# Patient Record
Sex: FEMALE | Race: WHITE | Employment: UNEMPLOYED | URBAN - METROPOLITAN AREA
[De-identification: names, ages, dates, MRNs, and addresses within clinical notes are randomized per-mention and may not be internally consistent; named-entity substitution may affect disease eponyms.]

---

## 2017-04-11 PROBLEM — G93.41 ACUTE METABOLIC ENCEPHALOPATHY: Status: ACTIVE | Noted: 2017-04-11

## 2017-05-25 ENCOUNTER — HOSPITAL ENCOUNTER (OUTPATIENT)
Dept: SURGERY | Age: 67
Discharge: HOME | End: 2017-05-25
Attending: OPHTHALMOLOGY | Admitting: OPHTHALMOLOGY

## 2017-05-25 VITALS
TEMPERATURE: 98.4 F | OXYGEN SATURATION: 96 % | SYSTOLIC BLOOD PRESSURE: 131 MMHG | WEIGHT: 94 LBS | HEART RATE: 100 BPM | HEIGHT: 61 IN | BODY MASS INDEX: 17.75 KG/M2 | RESPIRATION RATE: 16 BRPM | DIASTOLIC BLOOD PRESSURE: 87 MMHG

## 2017-05-25 RX ORDER — OXYCODONE HYDROCHLORIDE AND ACETAMINOPHEN 5; 325 MG/1; MG/1
2 TABLET ORAL PRN
Status: ACTIVE | OUTPATIENT
Start: 2017-05-25 | End: 2017-05-25

## 2017-05-25 RX ORDER — SODIUM CHLORIDE, SODIUM LACTATE, POTASSIUM CHLORIDE, CALCIUM CHLORIDE 600; 310; 30; 20 MG/100ML; MG/100ML; MG/100ML; MG/100ML
INJECTION, SOLUTION INTRAVENOUS CONTINUOUS
Status: DISCONTINUED | OUTPATIENT
Start: 2017-05-25 | End: 2017-05-26 | Stop reason: HOSPADM

## 2017-05-25 RX ORDER — MORPHINE SULFATE 2 MG/ML
2 INJECTION, SOLUTION INTRAMUSCULAR; INTRAVENOUS EVERY 5 MIN PRN
Status: DISCONTINUED | OUTPATIENT
Start: 2017-05-25 | End: 2017-05-26 | Stop reason: HOSPADM

## 2017-05-25 RX ORDER — LIDOCAINE HYDROCHLORIDE 10 MG/ML
1 INJECTION, SOLUTION EPIDURAL; INFILTRATION; INTRACAUDAL; PERINEURAL
Status: ACTIVE | OUTPATIENT
Start: 2017-05-25 | End: 2017-05-25

## 2017-05-25 RX ORDER — PROMETHAZINE HYDROCHLORIDE 25 MG/ML
6.25 INJECTION, SOLUTION INTRAMUSCULAR; INTRAVENOUS
Status: DISCONTINUED | OUTPATIENT
Start: 2017-05-25 | End: 2017-05-26 | Stop reason: HOSPADM

## 2017-05-25 RX ORDER — MEPERIDINE HYDROCHLORIDE 50 MG/ML
12.5 INJECTION INTRAMUSCULAR; INTRAVENOUS; SUBCUTANEOUS EVERY 5 MIN PRN
Status: DISCONTINUED | OUTPATIENT
Start: 2017-05-25 | End: 2017-05-26 | Stop reason: HOSPADM

## 2017-05-25 RX ORDER — OXYCODONE HYDROCHLORIDE AND ACETAMINOPHEN 5; 325 MG/1; MG/1
1 TABLET ORAL PRN
Status: ACTIVE | OUTPATIENT
Start: 2017-05-25 | End: 2017-05-25

## 2017-05-25 RX ORDER — SODIUM CHLORIDE 0.9 % (FLUSH) 0.9 %
10 SYRINGE (ML) INJECTION EVERY 12 HOURS SCHEDULED
Status: DISCONTINUED | OUTPATIENT
Start: 2017-05-25 | End: 2017-05-26 | Stop reason: HOSPADM

## 2017-05-25 RX ORDER — LABETALOL HYDROCHLORIDE 5 MG/ML
5 INJECTION, SOLUTION INTRAVENOUS EVERY 10 MIN PRN
Status: DISCONTINUED | OUTPATIENT
Start: 2017-05-25 | End: 2017-05-26 | Stop reason: HOSPADM

## 2017-05-25 RX ORDER — SODIUM CHLORIDE 0.9 % (FLUSH) 0.9 %
10 SYRINGE (ML) INJECTION PRN
Status: DISCONTINUED | OUTPATIENT
Start: 2017-05-25 | End: 2017-05-26 | Stop reason: HOSPADM

## 2017-05-25 RX ORDER — DIPHENHYDRAMINE HYDROCHLORIDE 50 MG/ML
12.5 INJECTION INTRAMUSCULAR; INTRAVENOUS
Status: ACTIVE | OUTPATIENT
Start: 2017-05-25 | End: 2017-05-25

## 2017-05-25 RX ORDER — MORPHINE SULFATE 2 MG/ML
1 INJECTION, SOLUTION INTRAMUSCULAR; INTRAVENOUS EVERY 5 MIN PRN
Status: DISCONTINUED | OUTPATIENT
Start: 2017-05-25 | End: 2017-05-26 | Stop reason: HOSPADM

## 2017-05-25 RX ORDER — ONDANSETRON 2 MG/ML
4 INJECTION INTRAMUSCULAR; INTRAVENOUS PRN
Status: DISCONTINUED | OUTPATIENT
Start: 2017-05-25 | End: 2017-05-26 | Stop reason: HOSPADM

## 2017-05-25 RX ORDER — HYDRALAZINE HYDROCHLORIDE 20 MG/ML
5 INJECTION INTRAMUSCULAR; INTRAVENOUS EVERY 10 MIN PRN
Status: DISCONTINUED | OUTPATIENT
Start: 2017-05-25 | End: 2017-05-26 | Stop reason: HOSPADM

## 2017-05-25 RX ADMIN — SODIUM CHLORIDE, SODIUM LACTATE, POTASSIUM CHLORIDE, CALCIUM CHLORIDE: 600; 310; 30; 20 INJECTION, SOLUTION INTRAVENOUS at 09:33

## 2017-05-25 ASSESSMENT — PAIN - FUNCTIONAL ASSESSMENT: PAIN_FUNCTIONAL_ASSESSMENT: 0-10

## 2017-05-25 ASSESSMENT — PAIN SCALES - GENERAL: PAINLEVEL_OUTOF10: 0

## 2017-05-25 ASSESSMENT — ENCOUNTER SYMPTOMS: SHORTNESS OF BREATH: 1

## 2017-06-01 ENCOUNTER — HOSPITAL ENCOUNTER (OUTPATIENT)
Dept: SURGERY | Age: 67
Discharge: HOME | End: 2017-06-01
Attending: OPHTHALMOLOGY | Admitting: OPHTHALMOLOGY

## 2017-06-01 VITALS
BODY MASS INDEX: 17.75 KG/M2 | HEIGHT: 61 IN | DIASTOLIC BLOOD PRESSURE: 85 MMHG | RESPIRATION RATE: 18 BRPM | SYSTOLIC BLOOD PRESSURE: 131 MMHG | OXYGEN SATURATION: 94 % | WEIGHT: 94 LBS | TEMPERATURE: 98.4 F | HEART RATE: 100 BPM

## 2017-06-01 RX ORDER — LIDOCAINE HYDROCHLORIDE 10 MG/ML
1 INJECTION, SOLUTION EPIDURAL; INFILTRATION; INTRACAUDAL; PERINEURAL
Status: ACTIVE | OUTPATIENT
Start: 2017-06-01 | End: 2017-06-01

## 2017-06-01 RX ORDER — SODIUM CHLORIDE 0.9 % (FLUSH) 0.9 %
10 SYRINGE (ML) INJECTION EVERY 12 HOURS SCHEDULED
Status: DISCONTINUED | OUTPATIENT
Start: 2017-06-01 | End: 2017-06-02 | Stop reason: HOSPADM

## 2017-06-01 RX ORDER — LABETALOL HYDROCHLORIDE 5 MG/ML
5 INJECTION, SOLUTION INTRAVENOUS EVERY 10 MIN PRN
Status: DISCONTINUED | OUTPATIENT
Start: 2017-06-01 | End: 2017-06-02 | Stop reason: HOSPADM

## 2017-06-01 RX ORDER — HYDRALAZINE HYDROCHLORIDE 20 MG/ML
5 INJECTION INTRAMUSCULAR; INTRAVENOUS EVERY 10 MIN PRN
Status: DISCONTINUED | OUTPATIENT
Start: 2017-06-01 | End: 2017-06-02 | Stop reason: HOSPADM

## 2017-06-01 RX ORDER — SODIUM CHLORIDE, SODIUM LACTATE, POTASSIUM CHLORIDE, CALCIUM CHLORIDE 600; 310; 30; 20 MG/100ML; MG/100ML; MG/100ML; MG/100ML
INJECTION, SOLUTION INTRAVENOUS CONTINUOUS
Status: DISCONTINUED | OUTPATIENT
Start: 2017-06-01 | End: 2017-06-02 | Stop reason: HOSPADM

## 2017-06-01 RX ORDER — SODIUM CHLORIDE 0.9 % (FLUSH) 0.9 %
10 SYRINGE (ML) INJECTION PRN
Status: DISCONTINUED | OUTPATIENT
Start: 2017-06-01 | End: 2017-06-02 | Stop reason: HOSPADM

## 2017-06-01 RX ORDER — ONDANSETRON 2 MG/ML
4 INJECTION INTRAMUSCULAR; INTRAVENOUS
Status: ACTIVE | OUTPATIENT
Start: 2017-06-01 | End: 2017-06-01

## 2017-06-01 RX ADMIN — SODIUM CHLORIDE, SODIUM LACTATE, POTASSIUM CHLORIDE, CALCIUM CHLORIDE: 600; 310; 30; 20 INJECTION, SOLUTION INTRAVENOUS at 08:20

## 2017-06-01 ASSESSMENT — COPD QUESTIONNAIRES: CAT_SEVERITY: MILD

## 2017-06-01 ASSESSMENT — PAIN - FUNCTIONAL ASSESSMENT: PAIN_FUNCTIONAL_ASSESSMENT: 0-10

## 2017-08-01 ENCOUNTER — HOSPITAL ENCOUNTER (OUTPATIENT)
Dept: MRI IMAGING | Age: 67
Discharge: HOME | End: 2017-08-01
Attending: NEUROLOGICAL SURGERY | Admitting: NEUROLOGICAL SURGERY

## 2017-08-01 DIAGNOSIS — I67.1 CEREBRAL ANEURYSM, NONRUPTURED: ICD-10-CM

## 2019-03-27 ENCOUNTER — APPOINTMENT (OUTPATIENT)
Dept: CT IMAGING | Age: 69
End: 2019-03-27
Payer: MEDICARE

## 2019-03-27 ENCOUNTER — APPOINTMENT (OUTPATIENT)
Dept: GENERAL RADIOLOGY | Age: 69
End: 2019-03-27
Payer: MEDICARE

## 2019-03-27 ENCOUNTER — HOSPITAL ENCOUNTER (EMERGENCY)
Age: 69
Discharge: HOME | End: 2019-03-27
Attending: EMERGENCY MEDICINE
Payer: MEDICARE

## 2019-03-27 VITALS
TEMPERATURE: 98.1 F | HEART RATE: 91 BPM | RESPIRATION RATE: 16 BRPM | WEIGHT: 102 LBS | HEIGHT: 61 IN | DIASTOLIC BLOOD PRESSURE: 90 MMHG | SYSTOLIC BLOOD PRESSURE: 178 MMHG | BODY MASS INDEX: 19.26 KG/M2 | OXYGEN SATURATION: 91 %

## 2019-03-27 DIAGNOSIS — R05.9 COUGH: ICD-10-CM

## 2019-03-27 DIAGNOSIS — J44.1 COPD EXACERBATION (HCC): Primary | ICD-10-CM

## 2019-03-27 LAB
A/G RATIO: 1.5 (ref 1.1–2.2)
ALBUMIN SERPL-MCNC: 4.1 G/DL (ref 3.4–5)
ALP BLD-CCNC: 68 U/L (ref 40–129)
ALT SERPL-CCNC: 11 U/L (ref 10–40)
ANION GAP SERPL CALCULATED.3IONS-SCNC: 10 MMOL/L (ref 3–16)
ANISOCYTOSIS: ABNORMAL
AST SERPL-CCNC: 21 U/L (ref 15–37)
ATYPICAL LYMPHOCYTE RELATIVE PERCENT: 12 % (ref 0–6)
BANDED NEUTROPHILS RELATIVE PERCENT: 7 % (ref 0–7)
BASOPHILS ABSOLUTE: 0.1 K/UL (ref 0–0.2)
BASOPHILS RELATIVE PERCENT: 1 %
BILIRUB SERPL-MCNC: 0.3 MG/DL (ref 0–1)
BUN BLDV-MCNC: 5 MG/DL (ref 7–20)
CALCIUM SERPL-MCNC: 9.2 MG/DL (ref 8.3–10.6)
CHLORIDE BLD-SCNC: 98 MMOL/L (ref 99–110)
CO2: 32 MMOL/L (ref 21–32)
CREAT SERPL-MCNC: 0.6 MG/DL (ref 0.6–1.2)
EKG ATRIAL RATE: 84 BPM
EKG DIAGNOSIS: NORMAL
EKG P AXIS: 75 DEGREES
EKG P-R INTERVAL: 138 MS
EKG Q-T INTERVAL: 366 MS
EKG QRS DURATION: 58 MS
EKG QTC CALCULATION (BAZETT): 432 MS
EKG R AXIS: 53 DEGREES
EKG T AXIS: 68 DEGREES
EKG VENTRICULAR RATE: 84 BPM
EOSINOPHILS ABSOLUTE: 0.1 K/UL (ref 0–0.6)
EOSINOPHILS RELATIVE PERCENT: 1 %
GFR AFRICAN AMERICAN: >60
GFR NON-AFRICAN AMERICAN: >60
GLOBULIN: 2.7 G/DL
GLUCOSE BLD-MCNC: 84 MG/DL (ref 70–99)
HCT VFR BLD CALC: 45.2 % (ref 36–48)
HEMATOLOGY PATH CONSULT: YES
HEMOGLOBIN: 15 G/DL (ref 12–16)
LYMPHOCYTES ABSOLUTE: 1.7 K/UL (ref 1–5.1)
LYMPHOCYTES RELATIVE PERCENT: 18 %
MCH RBC QN AUTO: 30.2 PG (ref 26–34)
MCHC RBC AUTO-ENTMCNC: 33.2 G/DL (ref 31–36)
MCV RBC AUTO: 90.9 FL (ref 80–100)
MONOCYTES ABSOLUTE: 0.7 K/UL (ref 0–1.3)
MONOCYTES RELATIVE PERCENT: 13 %
NEUTROPHILS ABSOLUTE: 3 K/UL (ref 1.7–7.7)
NEUTROPHILS RELATIVE PERCENT: 48 %
PDW BLD-RTO: 14 % (ref 12.4–15.4)
PLATELET # BLD: 177 K/UL (ref 135–450)
PLATELET SLIDE REVIEW: ADEQUATE
PMV BLD AUTO: 8.6 FL (ref 5–10.5)
POIKILOCYTES: ABNORMAL
POTASSIUM SERPL-SCNC: 4.5 MMOL/L (ref 3.5–5.1)
RBC # BLD: 4.97 M/UL (ref 4–5.2)
SODIUM BLD-SCNC: 140 MMOL/L (ref 136–145)
TOTAL PROTEIN: 6.8 G/DL (ref 6.4–8.2)
TROPONIN: <0.01 NG/ML
WBC # BLD: 5.5 K/UL (ref 4–11)

## 2019-03-27 PROCEDURE — 93005 ELECTROCARDIOGRAM TRACING: CPT | Performed by: EMERGENCY MEDICINE

## 2019-03-27 PROCEDURE — 80053 COMPREHEN METABOLIC PANEL: CPT

## 2019-03-27 PROCEDURE — 6360000002 HC RX W HCPCS: Performed by: NURSE PRACTITIONER

## 2019-03-27 PROCEDURE — 70496 CT ANGIOGRAPHY HEAD: CPT

## 2019-03-27 PROCEDURE — 84484 ASSAY OF TROPONIN QUANT: CPT

## 2019-03-27 PROCEDURE — 99285 EMERGENCY DEPT VISIT HI MDM: CPT

## 2019-03-27 PROCEDURE — 94640 AIRWAY INHALATION TREATMENT: CPT

## 2019-03-27 PROCEDURE — 6360000004 HC RX CONTRAST MEDICATION: Performed by: EMERGENCY MEDICINE

## 2019-03-27 PROCEDURE — 70450 CT HEAD/BRAIN W/O DYE: CPT

## 2019-03-27 PROCEDURE — 71046 X-RAY EXAM CHEST 2 VIEWS: CPT

## 2019-03-27 PROCEDURE — 85025 COMPLETE CBC W/AUTO DIFF WBC: CPT

## 2019-03-27 PROCEDURE — 93010 ELECTROCARDIOGRAM REPORT: CPT | Performed by: INTERNAL MEDICINE

## 2019-03-27 PROCEDURE — 70498 CT ANGIOGRAPHY NECK: CPT

## 2019-03-27 PROCEDURE — 6370000000 HC RX 637 (ALT 250 FOR IP): Performed by: NURSE PRACTITIONER

## 2019-03-27 RX ORDER — IPRATROPIUM BROMIDE AND ALBUTEROL SULFATE 2.5; .5 MG/3ML; MG/3ML
1 SOLUTION RESPIRATORY (INHALATION) ONCE
Status: COMPLETED | OUTPATIENT
Start: 2019-03-27 | End: 2019-03-27

## 2019-03-27 RX ORDER — ALBUTEROL SULFATE 90 UG/1
AEROSOL, METERED RESPIRATORY (INHALATION)
Qty: 1 INHALER | Refills: 1 | Status: SHIPPED | OUTPATIENT
Start: 2019-03-27

## 2019-03-27 RX ORDER — PREDNISONE 10 MG/1
40 TABLET ORAL DAILY
Qty: 20 TABLET | Refills: 0 | Status: SHIPPED | OUTPATIENT
Start: 2019-03-27 | End: 2019-04-01

## 2019-03-27 RX ORDER — PREDNISONE 20 MG/1
40 TABLET ORAL ONCE
Status: COMPLETED | OUTPATIENT
Start: 2019-03-27 | End: 2019-03-27

## 2019-03-27 RX ORDER — ALBUTEROL SULFATE 2.5 MG/3ML
5 SOLUTION RESPIRATORY (INHALATION) ONCE
Status: COMPLETED | OUTPATIENT
Start: 2019-03-27 | End: 2019-03-27

## 2019-03-27 RX ADMIN — ALBUTEROL SULFATE 5 MG: 2.5 SOLUTION RESPIRATORY (INHALATION) at 13:50

## 2019-03-27 RX ADMIN — IPRATROPIUM BROMIDE AND ALBUTEROL SULFATE 1 AMPULE: .5; 3 SOLUTION RESPIRATORY (INHALATION) at 13:50

## 2019-03-27 RX ADMIN — IOPAMIDOL 75 ML: 755 INJECTION, SOLUTION INTRAVENOUS at 14:45

## 2019-03-27 RX ADMIN — PREDNISONE 40 MG: 20 TABLET ORAL at 15:42

## 2019-03-27 ASSESSMENT — ENCOUNTER SYMPTOMS
COUGH: 1
NAUSEA: 1
BACK PAIN: 0
VOMITING: 0
ALLERGIC/IMMUNOLOGIC NEGATIVE: 1
EYES NEGATIVE: 1
ABDOMINAL DISTENTION: 0
SHORTNESS OF BREATH: 1
ABDOMINAL PAIN: 0
WHEEZING: 1

## 2019-03-28 ENCOUNTER — TELEPHONE (OUTPATIENT)
Dept: PULMONOLOGY | Age: 69
End: 2019-03-28

## 2019-03-28 LAB — HEMATOLOGY PATH CONSULT: NORMAL

## 2019-03-28 NOTE — LETTER
[facility] Pulmonary Critical Care and Sleep  [address] [place] [address] Gustavo Carter  Phone: [phone]  Fax: [phone]    Evon Alvarez MD        April 9, 2019     Minda Jara, [address] [place] [address]    Patient: Siobhan Wright   MR Number: [id number]   YOB: 1950   Date of Visit: 3/28/2019       Dear Minda Jara:    This letter is to keep you informed of your patient, Siobhan Wright, who you referred to me for COPD. She was scheduled to see me on 3-28-19. The patient received a reminder call but did not keep the appointment. As this is the first New Patient No Show, we will reschedule if the patient contacts us. I appreciate the confidence and trust you place in me with your referrals and am glad to be of service. If you have questions, please do not hesitate to call me. I look forward to following Aditya Obando along with you.     Sincerely,          Evon Alvarez MD

## 2019-03-28 NOTE — TELEPHONE ENCOUNTER
Patient did not show for NPT COPD visit  with Dr. Brooke Zazueta on 3/28/19. Reason:  Unknown    This is patient's first no show. Patient was ano show on: NA.      Patient did not reschedule. Reschedule date:  Pt will need to be called to reschedule appt.

## 2019-04-01 ENCOUNTER — APPOINTMENT (OUTPATIENT)
Dept: GENERAL RADIOLOGY | Age: 69
DRG: 189 | End: 2019-04-01
Payer: MEDICARE

## 2019-04-01 ENCOUNTER — HOSPITAL ENCOUNTER (INPATIENT)
Age: 69
LOS: 1 days | Discharge: LEFT BEFORE DISCHARGE | DRG: 189 | End: 2019-04-02
Attending: EMERGENCY MEDICINE | Admitting: INTERNAL MEDICINE
Payer: MEDICARE

## 2019-04-01 DIAGNOSIS — J44.1 COPD EXACERBATION (HCC): Primary | ICD-10-CM

## 2019-04-01 PROBLEM — J44.9 COPD (CHRONIC OBSTRUCTIVE PULMONARY DISEASE) (HCC): Status: ACTIVE | Noted: 2019-04-01

## 2019-04-01 LAB
A/G RATIO: 1.2 (ref 1.1–2.2)
ALBUMIN SERPL-MCNC: 3.8 G/DL (ref 3.4–5)
ALP BLD-CCNC: 58 U/L (ref 40–129)
ALT SERPL-CCNC: 13 U/L (ref 10–40)
ANION GAP SERPL CALCULATED.3IONS-SCNC: 14 MMOL/L (ref 3–16)
ANISOCYTOSIS: ABNORMAL
AST SERPL-CCNC: 24 U/L (ref 15–37)
ATYPICAL LYMPHOCYTE RELATIVE PERCENT: 9 % (ref 0–6)
BACTERIA: ABNORMAL /HPF
BANDED NEUTROPHILS RELATIVE PERCENT: 18 % (ref 0–7)
BASOPHILS ABSOLUTE: 0 K/UL (ref 0–0.2)
BASOPHILS RELATIVE PERCENT: 0 %
BILIRUB SERPL-MCNC: 0.5 MG/DL (ref 0–1)
BILIRUBIN URINE: ABNORMAL
BLOOD, URINE: NEGATIVE
BUN BLDV-MCNC: 12 MG/DL (ref 7–20)
CALCIUM SERPL-MCNC: 9.3 MG/DL (ref 8.3–10.6)
CHLORIDE BLD-SCNC: 89 MMOL/L (ref 99–110)
CLARITY: ABNORMAL
CO2: 31 MMOL/L (ref 21–32)
COLOR: ABNORMAL
CREAT SERPL-MCNC: <0.5 MG/DL (ref 0.6–1.2)
EOSINOPHILS ABSOLUTE: 0 K/UL (ref 0–0.6)
EOSINOPHILS RELATIVE PERCENT: 0 %
EPITHELIAL CELLS, UA: ABNORMAL /HPF
GFR AFRICAN AMERICAN: >60
GFR NON-AFRICAN AMERICAN: >60
GLOBULIN: 3.1 G/DL
GLUCOSE BLD-MCNC: 109 MG/DL (ref 70–99)
GLUCOSE URINE: NEGATIVE MG/DL
HCT VFR BLD CALC: 45.7 % (ref 36–48)
HEMATOLOGY PATH CONSULT: NO
HEMOGLOBIN: 15.4 G/DL (ref 12–16)
KETONES, URINE: 15 MG/DL
LEUKOCYTE ESTERASE, URINE: NEGATIVE
LYMPHOCYTES ABSOLUTE: 1.6 K/UL (ref 1–5.1)
LYMPHOCYTES RELATIVE PERCENT: 13 %
MCH RBC QN AUTO: 30.4 PG (ref 26–34)
MCHC RBC AUTO-ENTMCNC: 33.6 G/DL (ref 31–36)
MCV RBC AUTO: 90.4 FL (ref 80–100)
MICROSCOPIC EXAMINATION: YES
MONOCYTES ABSOLUTE: 0.4 K/UL (ref 0–1.3)
MONOCYTES RELATIVE PERCENT: 6 %
NEUTROPHILS ABSOLUTE: 5.3 K/UL (ref 1.7–7.7)
NEUTROPHILS RELATIVE PERCENT: 54 %
NITRITE, URINE: NEGATIVE
PDW BLD-RTO: 13.5 % (ref 12.4–15.4)
PH UA: 6 (ref 5–8)
PLATELET # BLD: 155 K/UL (ref 135–450)
PLATELET SLIDE REVIEW: ADEQUATE
PMV BLD AUTO: 8.2 FL (ref 5–10.5)
POIKILOCYTES: ABNORMAL
POTASSIUM SERPL-SCNC: 3.7 MMOL/L (ref 3.5–5.1)
PROTEIN UA: ABNORMAL MG/DL
RBC # BLD: 5.06 M/UL (ref 4–5.2)
RBC UA: ABNORMAL /HPF (ref 0–2)
SODIUM BLD-SCNC: 134 MMOL/L (ref 136–145)
SPECIFIC GRAVITY UA: >=1.03 (ref 1–1.03)
TOTAL PROTEIN: 6.9 G/DL (ref 6.4–8.2)
TROPONIN: <0.01 NG/ML
URINE REFLEX TO CULTURE: ABNORMAL
URINE TYPE: ABNORMAL
UROBILINOGEN, URINE: 0.2 E.U./DL
WBC # BLD: 7.4 K/UL (ref 4–11)
WBC UA: ABNORMAL /HPF (ref 0–5)

## 2019-04-01 PROCEDURE — 6360000002 HC RX W HCPCS: Performed by: INTERNAL MEDICINE

## 2019-04-01 PROCEDURE — 93005 ELECTROCARDIOGRAM TRACING: CPT | Performed by: EMERGENCY MEDICINE

## 2019-04-01 PROCEDURE — 71046 X-RAY EXAM CHEST 2 VIEWS: CPT

## 2019-04-01 PROCEDURE — 6370000000 HC RX 637 (ALT 250 FOR IP): Performed by: INTERNAL MEDICINE

## 2019-04-01 PROCEDURE — 80053 COMPREHEN METABOLIC PANEL: CPT

## 2019-04-01 PROCEDURE — 1200000000 HC SEMI PRIVATE

## 2019-04-01 PROCEDURE — 81001 URINALYSIS AUTO W/SCOPE: CPT

## 2019-04-01 PROCEDURE — 6370000000 HC RX 637 (ALT 250 FOR IP): Performed by: EMERGENCY MEDICINE

## 2019-04-01 PROCEDURE — 2580000003 HC RX 258

## 2019-04-01 PROCEDURE — 2580000003 HC RX 258: Performed by: INTERNAL MEDICINE

## 2019-04-01 PROCEDURE — 99285 EMERGENCY DEPT VISIT HI MDM: CPT

## 2019-04-01 PROCEDURE — 96365 THER/PROPH/DIAG IV INF INIT: CPT

## 2019-04-01 PROCEDURE — 85025 COMPLETE CBC W/AUTO DIFF WBC: CPT

## 2019-04-01 PROCEDURE — 2700000000 HC OXYGEN THERAPY PER DAY

## 2019-04-01 PROCEDURE — 84484 ASSAY OF TROPONIN QUANT: CPT

## 2019-04-01 PROCEDURE — 94761 N-INVAS EAR/PLS OXIMETRY MLT: CPT

## 2019-04-01 PROCEDURE — 94640 AIRWAY INHALATION TREATMENT: CPT

## 2019-04-01 RX ORDER — PREDNISONE 20 MG/1
60 TABLET ORAL ONCE
Status: COMPLETED | OUTPATIENT
Start: 2019-04-01 | End: 2019-04-01

## 2019-04-01 RX ORDER — SODIUM CHLORIDE 0.9 % (FLUSH) 0.9 %
10 SYRINGE (ML) INJECTION EVERY 12 HOURS SCHEDULED
Status: DISCONTINUED | OUTPATIENT
Start: 2019-04-01 | End: 2019-04-02 | Stop reason: HOSPADM

## 2019-04-01 RX ORDER — ATORVASTATIN CALCIUM 40 MG/1
40 TABLET, FILM COATED ORAL DAILY
Status: DISCONTINUED | OUTPATIENT
Start: 2019-04-02 | End: 2019-04-02 | Stop reason: HOSPADM

## 2019-04-01 RX ORDER — IPRATROPIUM BROMIDE AND ALBUTEROL SULFATE 2.5; .5 MG/3ML; MG/3ML
1 SOLUTION RESPIRATORY (INHALATION)
Status: DISCONTINUED | OUTPATIENT
Start: 2019-04-02 | End: 2019-04-02 | Stop reason: HOSPADM

## 2019-04-01 RX ORDER — ALBUTEROL SULFATE 90 UG/1
2 AEROSOL, METERED RESPIRATORY (INHALATION) EVERY 6 HOURS PRN
Status: DISCONTINUED | OUTPATIENT
Start: 2019-04-01 | End: 2019-04-02 | Stop reason: HOSPADM

## 2019-04-01 RX ORDER — IPRATROPIUM BROMIDE AND ALBUTEROL SULFATE 2.5; .5 MG/3ML; MG/3ML
1 SOLUTION RESPIRATORY (INHALATION) ONCE
Status: COMPLETED | OUTPATIENT
Start: 2019-04-01 | End: 2019-04-01

## 2019-04-01 RX ORDER — ONDANSETRON 2 MG/ML
4 INJECTION INTRAMUSCULAR; INTRAVENOUS EVERY 6 HOURS PRN
Status: DISCONTINUED | OUTPATIENT
Start: 2019-04-01 | End: 2019-04-02 | Stop reason: HOSPADM

## 2019-04-01 RX ORDER — AMLODIPINE BESYLATE 5 MG/1
5 TABLET ORAL DAILY
Status: DISCONTINUED | OUTPATIENT
Start: 2019-04-02 | End: 2019-04-02 | Stop reason: HOSPADM

## 2019-04-01 RX ORDER — ACETAMINOPHEN 325 MG/1
650 TABLET ORAL EVERY 4 HOURS PRN
Status: DISCONTINUED | OUTPATIENT
Start: 2019-04-01 | End: 2019-04-02 | Stop reason: HOSPADM

## 2019-04-01 RX ORDER — PREDNISONE 20 MG/1
40 TABLET ORAL DAILY
Status: DISCONTINUED | OUTPATIENT
Start: 2019-04-04 | End: 2019-04-02 | Stop reason: HOSPADM

## 2019-04-01 RX ORDER — SODIUM CHLORIDE 9 MG/ML
INJECTION, SOLUTION INTRAVENOUS
Status: COMPLETED
Start: 2019-04-01 | End: 2019-04-01

## 2019-04-01 RX ORDER — SODIUM CHLORIDE 0.9 % (FLUSH) 0.9 %
10 SYRINGE (ML) INJECTION PRN
Status: DISCONTINUED | OUTPATIENT
Start: 2019-04-01 | End: 2019-04-02 | Stop reason: HOSPADM

## 2019-04-01 RX ORDER — METHYLPREDNISOLONE SODIUM SUCCINATE 40 MG/ML
40 INJECTION, POWDER, LYOPHILIZED, FOR SOLUTION INTRAMUSCULAR; INTRAVENOUS EVERY 6 HOURS
Status: DISCONTINUED | OUTPATIENT
Start: 2019-04-01 | End: 2019-04-02 | Stop reason: HOSPADM

## 2019-04-01 RX ORDER — LORAZEPAM 0.5 MG/1
0.5 TABLET ORAL EVERY 6 HOURS PRN
Status: DISCONTINUED | OUTPATIENT
Start: 2019-04-01 | End: 2019-04-02

## 2019-04-01 RX ORDER — IPRATROPIUM BROMIDE AND ALBUTEROL SULFATE 2.5; .5 MG/3ML; MG/3ML
2 SOLUTION RESPIRATORY (INHALATION) ONCE
Status: COMPLETED | OUTPATIENT
Start: 2019-04-01 | End: 2019-04-01

## 2019-04-01 RX ORDER — LOSARTAN POTASSIUM 25 MG/1
1 TABLET ORAL
COMMUNITY

## 2019-04-01 RX ADMIN — SODIUM CHLORIDE 100 ML: 900 INJECTION INTRAVENOUS at 23:40

## 2019-04-01 RX ADMIN — IPRATROPIUM BROMIDE AND ALBUTEROL SULFATE 2 AMPULE: .5; 3 SOLUTION RESPIRATORY (INHALATION) at 20:12

## 2019-04-01 RX ADMIN — IPRATROPIUM BROMIDE AND ALBUTEROL SULFATE 1 AMPULE: .5; 3 SOLUTION RESPIRATORY (INHALATION) at 18:59

## 2019-04-01 RX ADMIN — PREDNISONE 60 MG: 20 TABLET ORAL at 18:25

## 2019-04-01 RX ADMIN — CEFTRIAXONE SODIUM 1 G: 1 INJECTION, POWDER, FOR SOLUTION INTRAMUSCULAR; INTRAVENOUS at 23:41

## 2019-04-01 ASSESSMENT — ENCOUNTER SYMPTOMS
VOICE CHANGE: 0
NAUSEA: 0
COLOR CHANGE: 0
STRIDOR: 0
ABDOMINAL PAIN: 0
FACIAL SWELLING: 0
COUGH: 1
SHORTNESS OF BREATH: 1
TROUBLE SWALLOWING: 0
WHEEZING: 1
SPUTUM PRODUCTION: 1
VOMITING: 0

## 2019-04-01 NOTE — ED PROVIDER NOTES
[facility]  ED  eMERGENCY dEPARTMENT eNCOUnter      Pt Name: Yvonne Beavers  MRN: [id number]  [place] 1950  Date of evaluation: 4/1/2019  Provider: Patrick Beck MD    CHIEF COMPLAINT       Chief Complaint   Patient presents with    Shortness of Breath     pt has copd, went to pcp, 82% on RA, 911 called, was evaluated here last week         HISTORY OF PRESENT ILLNESS   (Location/Symptom, Timing/Onset, Context/Setting, Quality, Duration, Modifying Factors, Severity)  Note limiting factors. The history is provided by the patient. Shortness of Breath   Severity:  Moderate  Onset quality:  Gradual  Duration:  2 weeks  Timing:  Constant  Progression:  Worsening  Chronicity:  Recurrent  Context comment:  Hx of COPD, still smoking, reports worsening SOB  Relieved by:  Nothing  Exacerbated by: smoking. Associated symptoms: cough, sputum production and wheezing    Associated symptoms: no abdominal pain, no chest pain, no fever, no neck pain and no vomiting    Risk factors: no hx of PE/DVT        Nursing Notes were reviewed. REVIEW OFSYSTEMS    (2-9 systems for level 4, 10 or more for level 5)     Review of Systems   Constitutional: Negative for appetite change, fever and unexpected weight change. HENT: Negative for facial swelling, trouble swallowing and voice change. Eyes: Negative for visual disturbance. Respiratory: Positive for cough, sputum production, shortness of breath and wheezing. Negative for stridor. Cardiovascular: Negative for chest pain and palpitations. Gastrointestinal: Negative for abdominal pain, nausea and vomiting. Genitourinary: Negative for dysuria and vaginal bleeding. Musculoskeletal: Negative for neck pain and neck stiffness. Skin: Negative for color change and wound. Neurological: Negative for seizures and syncope. Psychiatric/Behavioral: Negative for self-injury and suicidal ideas.        Except as noted above the remainder of the review of systems was reviewed and negative. PAST MEDICAL HISTORY     Past Medical History:   Diagnosis Date    [facility]olic [facility]     Anxiety     COPD (chronic obstructive pulmonary disease) ([facility]     Current smoker     since age 15    Hyperlipidemia     Hypertension          SURGICAL HISTORY       Past Surgical History:   Procedure Laterality Date    CATARACT REMOVAL WITH IMPLANT Right 05/25/2017    CATARACT REMOVAL WITH IMPLANT Left 06/01/2017         CURRENT MEDICATIONS       Previous Medications    ALBUTEROL SULFATE HFA (PROAIR HFA) 108 (90 BASE) MCG/ACT INHALER    Use 2 puffs every 4 hours while awake PRN cough/wheezing  Dispense with SPACER and Instruct on use. May sub Ventolin or Proventil as needed per [employer]. AMLODIPINE (NORVASC) 5 MG TABLET    Take 5 mg by mouth daily    ATORVASTATIN (LIPITOR) 40 MG TABLET    Take 40 mg by mouth daily. LORAZEPAM (ATIVAN) 0.5 MG TABLET    Take 0.5 mg by mouth 3 times daily as needed    TIOTROPIUM (SPIRIVA) 18 MCG INHALATION CAPSULE    Inhale 1 capsule into the lungs daily       ALLERGIES     Sulfa antibiotics and Sulfacetamide    FAMILY HISTORY       Family History   Problem Relation Age of Onset    High Cholesterol Mother     High Blood Pressure Father           SOCIAL HISTORY       Social History     Socioeconomic History    Marital status:       Spouse name: None    Number of children: None    Years of education: None    Highest education level: None   Occupational History    None   Social Needs    Financial resource strain: None    Food insecurity:     Worry: None     Inability: None    Transportation needs:     Medical: None     Non-medical: None   Tobacco Use    Smoking status: Current Every Day Smoker     Packs/day: 0.50     Years: 30.00     Pack years: 15.00     Types: Cigarettes    Smokeless tobacco: Never Used   Substance and Sexual Activity    Alcohol use: Yes     Comment: 2-4    Drug use: No    Sexual activity: None   Lifestyle  Physical activity:     Days per week: None     Minutes per session: None    Stress: None   Relationships    Social connections:     Talks on phone: None     Gets together: None     Attends [employer] service: None     Active member of club or organization: None     Attends meetings of clubs or organizations: None     Relationship status: None    Intimate partner violence:     Fear of current or ex partner: None     Emotionally abused: None     Physically abused: None     Forced sexual activity: None   Other Topics Concern    None   Social History Narrative    None         PHYSICAL EXAM    (up to 7 for level 4, 8 or more for level 5)     ED Triage Vitals [04/01/19 1633]   BP Temp Temp Source Pulse Resp SpO2 Height Weight   (!) 154/93 98.1 °F (36.7 °C) Oral 100 20 (!) 86 % -- 80 lb (36.3 kg)       Physical Exam   Constitutional: She is oriented to person, place, and time. She appears well-developed and well-nourished. HENT:   Head: Normocephalic and atraumatic. Right Ear: External ear normal.   Left Ear: External ear normal.   Eyes: Conjunctivae and EOM are normal.   Neck: Neck supple. No JVD present. No tracheal deviation present. Cardiovascular: Normal rate and intact distal pulses. Pulmonary/Chest: Effort normal. No respiratory distress. She has wheezes (end expiratory wheezing). Abdominal: Soft. She exhibits no distension. There is no tenderness. There is no rebound and no guarding. Musculoskeletal: Normal range of motion. She exhibits no tenderness or deformity. Neurological: She is alert and oriented to person, place, and time. No cranial nerve deficit. Skin: Skin is warm and dry. She is not diaphoretic. Nursing note and vitals reviewed.       DIAGNOSTIC RESULTS     EKG:All EKG's are interpreted by the Emergency Department Physician who either signs or Co-signs this chart in the absence of a cardiologist.    The Ekg interpreted by me shows  normal sinus rhythm with a rate of 97  Axis is Normal  QTc is  462ms  Intervals and Durations are unremarkable. ST Segments: Non-specific T wave abnormalities in interior leads  New nonspecific T-wave abnormalities in the inferior leads from previous EKG on 3/27/19      RADIOLOGY:     Interpretation per the Radiologist below, if available at the time of this note:    XR CHEST STANDARD (2 VW)   Final Result   No acute process.                LABS:  Labs Reviewed   CBC WITH AUTO DIFFERENTIAL - Abnormal; Notable for the following components:       Result Value    Bands Relative 18 (*)     Atypical Lymphocytes Relative 9 (*)     Anisocytosis 1+ (*)     Poikilocytes 1+ (*)     All other components within normal limits    Narrative:     Performed at:  [address], [address]   Phone [phone]   COMPREHENSIVE METABOLIC PANEL - Abnormal; Notable for the following components:    Sodium 134 (*)     Chloride 89 (*)     Glucose 109 (*)     CREATININE <0.5 (*)     All other components within normal limits    Narrative:     Performed at:  [address], [address]   Phone [phone]   URINE RT REFLEX TO CULTURE - Abnormal; Notable for the following components:    Clarity, UA SL CLOUDY (*)     Bilirubin Urine SMALL (*)     Ketones, Urine 15 (*)     Protein, UA TRACE (*)     All other components within normal limits    Narrative:     Performed at:  [address], [address]   Phone [phone]   MICROSCOPIC URINALYSIS - Abnormal; Notable for the following components:    RBC, UA 3-5 (*)     Bacteria, UA 2+ (*)     All other components within normal limits    Narrative:     Performed at:  [address], [address]   Phone [phone]   TROPONIN    Narrative:     Performed at:  [facility]  [address],  [place], [address]   Phone [phone]       All otherlabs were within normal range or not returned as of this dictation. EMERGENCY DEPARTMENT COURSE and DIFFERENTIAL DIAGNOSIS/MDM:   Vitals:    Vitals:    04/01/19 1813 04/01/19 1858 04/01/19 1923 04/01/19 2146   BP:   (!) 168/107 (!) 152/85   Pulse: 98  96 108   Resp: 18  18 18   Temp:       TempSrc:       SpO2: 99% 100% 98% 98%   Weight:             MDM  The patient is afebrile hemodynamic stable but does have diffuse and index port wheezing. She is given steroids and 3 breathing treatments however remains hypoxic when ambulating. I feel she warrants admission for further treatment of her COPD exacerbation as well as further cardiac monitoring. The patient's presses understanding and agreement with this plan and is discharged home. CONSULTS:  IP CONSULT TO HOSPITALIST    PROCEDURES:  Unless otherwise noted below, none     Procedures    FINAL IMPRESSION      1. COPD exacerbation ([address]          DISPOSITION/PLAN   DISPOSITION Admitted 04/01/2019 09:16:10 PM        (Please note that portions of this note were completed with a voice recognition program.  Efforts were made to edit the dictations but occasionally words aremis-transcribed. )    Maria Isabel Ron MD (electronically signed)  Attending Emergency Physician           Maria Isable Ron MD  04/01/19 8403

## 2019-04-01 NOTE — ED NOTES
RN entered room and pt was on room air and talking on phone and when oxygen level was checked pt was 85% on room air and on 3 liters pt was 90% and then increased to 4 liters and sats came up to 96%.      Naomy Emanuel RN  04/01/19 9534

## 2019-04-02 VITALS
SYSTOLIC BLOOD PRESSURE: 159 MMHG | TEMPERATURE: 98.1 F | RESPIRATION RATE: 16 BRPM | HEIGHT: 61 IN | DIASTOLIC BLOOD PRESSURE: 89 MMHG | WEIGHT: 91.8 LBS | HEART RATE: 82 BPM | OXYGEN SATURATION: 92 % | BODY MASS INDEX: 17.33 KG/M2

## 2019-04-02 LAB
EKG ATRIAL RATE: 97 BPM
EKG DIAGNOSIS: NORMAL
EKG P AXIS: 63 DEGREES
EKG P-R INTERVAL: 120 MS
EKG Q-T INTERVAL: 364 MS
EKG QRS DURATION: 68 MS
EKG QTC CALCULATION (BAZETT): 462 MS
EKG R AXIS: 7 DEGREES
EKG T AXIS: 47 DEGREES
EKG VENTRICULAR RATE: 97 BPM
PROCALCITONIN: 0.07 NG/ML (ref 0–0.15)

## 2019-04-02 PROCEDURE — 6370000000 HC RX 637 (ALT 250 FOR IP): Performed by: INTERNAL MEDICINE

## 2019-04-02 PROCEDURE — 94150 VITAL CAPACITY TEST: CPT

## 2019-04-02 PROCEDURE — 96366 THER/PROPH/DIAG IV INF ADDON: CPT

## 2019-04-02 PROCEDURE — 36415 COLL VENOUS BLD VENIPUNCTURE: CPT

## 2019-04-02 PROCEDURE — 96365 THER/PROPH/DIAG IV INF INIT: CPT

## 2019-04-02 PROCEDURE — 96372 THER/PROPH/DIAG INJ SC/IM: CPT

## 2019-04-02 PROCEDURE — 93010 ELECTROCARDIOGRAM REPORT: CPT | Performed by: INTERNAL MEDICINE

## 2019-04-02 PROCEDURE — 96376 TX/PRO/DX INJ SAME DRUG ADON: CPT

## 2019-04-02 PROCEDURE — 96367 TX/PROPH/DG ADDL SEQ IV INF: CPT

## 2019-04-02 PROCEDURE — 99406 BEHAV CHNG SMOKING 3-10 MIN: CPT

## 2019-04-02 PROCEDURE — 84145 PROCALCITONIN (PCT): CPT

## 2019-04-02 PROCEDURE — 94640 AIRWAY INHALATION TREATMENT: CPT

## 2019-04-02 PROCEDURE — 6360000002 HC RX W HCPCS: Performed by: INTERNAL MEDICINE

## 2019-04-02 PROCEDURE — 2580000003 HC RX 258: Performed by: INTERNAL MEDICINE

## 2019-04-02 PROCEDURE — 2700000000 HC OXYGEN THERAPY PER DAY

## 2019-04-02 PROCEDURE — 96375 TX/PRO/DX INJ NEW DRUG ADDON: CPT

## 2019-04-02 PROCEDURE — 94761 N-INVAS EAR/PLS OXIMETRY MLT: CPT

## 2019-04-02 RX ORDER — LOSARTAN POTASSIUM 25 MG/1
25 TABLET ORAL DAILY
Status: DISCONTINUED | OUTPATIENT
Start: 2019-04-02 | End: 2019-04-02 | Stop reason: HOSPADM

## 2019-04-02 RX ADMIN — AZITHROMYCIN MONOHYDRATE 500 MG: 500 INJECTION, POWDER, LYOPHILIZED, FOR SOLUTION INTRAVENOUS at 04:32

## 2019-04-02 RX ADMIN — ATORVASTATIN CALCIUM 40 MG: 40 TABLET, FILM COATED ORAL at 10:06

## 2019-04-02 RX ADMIN — METHYLPREDNISOLONE SODIUM SUCCINATE 40 MG: 40 INJECTION, POWDER, FOR SOLUTION INTRAMUSCULAR; INTRAVENOUS at 06:49

## 2019-04-02 RX ADMIN — IPRATROPIUM BROMIDE AND ALBUTEROL SULFATE 1 AMPULE: .5; 3 SOLUTION RESPIRATORY (INHALATION) at 12:27

## 2019-04-02 RX ADMIN — IPRATROPIUM BROMIDE AND ALBUTEROL SULFATE 1 AMPULE: .5; 3 SOLUTION RESPIRATORY (INHALATION) at 09:05

## 2019-04-02 RX ADMIN — SODIUM CHLORIDE, PRESERVATIVE FREE 10 ML: 5 INJECTION INTRAVENOUS at 01:06

## 2019-04-02 RX ADMIN — SODIUM CHLORIDE, PRESERVATIVE FREE 10 ML: 5 INJECTION INTRAVENOUS at 10:07

## 2019-04-02 RX ADMIN — SODIUM CHLORIDE, PRESERVATIVE FREE 10 ML: 5 INJECTION INTRAVENOUS at 06:50

## 2019-04-02 RX ADMIN — METHYLPREDNISOLONE SODIUM SUCCINATE 40 MG: 40 INJECTION, POWDER, FOR SOLUTION INTRAMUSCULAR; INTRAVENOUS at 01:06

## 2019-04-02 RX ADMIN — ENOXAPARIN SODIUM 40 MG: 40 INJECTION SUBCUTANEOUS at 10:06

## 2019-04-02 RX ADMIN — AMLODIPINE BESYLATE 5 MG: 5 TABLET ORAL at 10:06

## 2019-04-02 ASSESSMENT — PAIN SCALES - GENERAL
PAINLEVEL_OUTOF10: 0

## 2019-04-02 NOTE — DISCHARGE SUMMARY
This patient left AMA. She was here with an AECOPD. She was still wheezing badly and had a new oxygen requirement. She understood the risks of leaving with low oxygen levels. She absolutely insisted on leaving to take care of her dog, rather than asking her friend to do it. Her friend supported her in this plan.

## 2019-04-02 NOTE — H&P
Hospital Medicine History & Physical      PCP: Khanh Ansari MD    Date of Admission: 4/1/2019    Date of Service: Pt seen/examined on 4/1/19 and Admitted to Inpatient with expected LOS greater than two midnights due to medical therapy  Chief Complaint:  sob      History Of Present Illness:      81 y.o. female who presented to [facility] with increased sob. Pt reports cough. Intermittently productive  Denies cp. No pillow orthopnea. No sick contacts  Pt continues to smoke  Decided to come to ed  Received multiple rounds of inhalers and remains very wheezy  Pt noted to be hypoxic in the ED    Past Medical History:          Diagnosis Date    [facility] ([facility] [address]     Anxiety     COPD (chronic obstructive pulmonary disease) ([facility] [address] [facility]     Current smoker     since age 15    Hyperlipidemia     Hypertension        Past Surgical History:          Procedure Laterality Date    CATARACT REMOVAL WITH IMPLANT Right 05/25/2017    CATARACT REMOVAL WITH IMPLANT Left 06/01/2017       Medications Prior to Admission:      Prior to Admission medications    Medication Sig Start Date End Date Taking? Authorizing Provider   albuterol sulfate HFA (PROAIR HFA) 108 (90 Base) MCG/ACT inhaler Use 2 puffs every 4 hours while awake PRN cough/wheezing  Dispense with SPACER and Instruct on use. May sub Ventolin or Proventil as needed per [employer]. 3/27/19   MARIA TERESA Gutierres CNP   tiotropium (SPIRIVA) 18 MCG inhalation capsule Inhale 1 capsule into the lungs daily 4/18/17   Eneida Pelaez MD   amLODIPine (NORVASC) 5 MG tablet Take 5 mg by mouth daily 1/10/17   Historical Provider, MD   LORazepam (ATIVAN) 0.5 MG tablet Take 0.5 mg by mouth 3 times daily as needed 2/27/17   Historical Provider, MD   atorvastatin (LIPITOR) 40 MG tablet Take 40 mg by mouth daily.     Historical Provider, MD       Allergies:  Sulfa antibiotics and Sulfacetamide    Social History:      The patient currently lives with family    TOBACCO:   reports that she has been smoking cigarettes. She has a 15.00 pack-year smoking history. She has never used smokeless tobacco.  ETOH:   reports that she drinks alcohol. Family History:      Reviewed in detail and negative for DM, CAD, Cancer, CVA. Positive as follows:        Problem Relation Age of Onset    High Cholesterol Mother     High Blood Pressure Father        REVIEW OF SYSTEMS:   Pertinent positives as noted in the HPI. All other systems reviewed and negative. PHYSICAL EXAM PERFORMED:    BP (!) 168/107   Pulse 96   Temp 98.1 °F (36.7 °C) (Oral)   Resp 18   Wt 80 lb (36.3 kg)   SpO2 98%   BMI 15.12 kg/m²     General appearance:  No apparent distress, appears stated age and cooperative. HEENT:  Normal cephalic, atraumatic without obvious deformity. Pupils equal, round, and reactive to light. Extra ocular muscles intact. Conjunctivae/corneas clear. Neck: Supple, with full range of motion. No jugular venous distention. Trachea midline. Respiratory:  Diffuse exp wheezes. Scattered rhonchi  Cardiovascular:  Regular rate and rhythm with normal S1/S2 without murmurs, rubs or gallops. Abdomen: Soft, non-tender, non-distended with normal bowel sounds. Musculoskeletal:  No clubbing, cyanosis or edema bilaterally. Full range of motion without deformity. Skin: Skin color, texture, turgor normal.  No rashes or lesions. Neurologic:  Neurovascularly intact without any focal sensory/motor deficits.  Cranial nerves: II-XII intact, grossly non-focal.  Psychiatric:  Alert and oriented, thought content appropriate, normal insight  Capillary Refill: Brisk,< 3 seconds   Peripheral Pulses: +2 palpable, equal bilaterally       Labs:     Recent Labs     04/01/19  1649   WBC 7.4   HGB 15.4   HCT 45.7        Recent Labs     04/01/19  1649   *   K 3.7   CL 89*   CO2 31   BUN 12   CREATININE <0.5*   CALCIUM 9.3     Recent Labs     04/01/19  1649   AST 24   ALT 13   BILITOT 0.5   ALKPHOS 58     No results for input(s): INR in the last 72 hours. Recent Labs     04/01/19  1649   TROPONINI <0.01       Urinalysis:      Lab Results   Component Value Date    NITRU Negative 04/01/2019    WBCUA 3-5 04/01/2019    BACTERIA 2+ 04/01/2019    RBCUA 3-5 04/01/2019    BLOODU Negative 04/01/2019    SPECGRAV >=1.030 04/01/2019    GLUCOSEU Negative 04/01/2019       Radiology:       XR CHEST STANDARD (2 VW)   Final Result   No acute process. ASSESSMENT:    Acute hypoxic respiratory failure  COPD exacerbation  HTN  ETOH dependency    PLAN:    Continue oxygen tx  duonebs q 4 + q 2   Solumedrol 40 iv q 6  Rocephin iv and zithromax iv  Continue norvasc  Thiamine. Folate and MVI po  Monitor for withdrawal  Assess for home oxygen  prob 1-2 days of inpt care    DVT Prophylaxis: lovenox  Diet: No diet orders on file  Code Status: Prior      Dispo - 2-3 days       Rossie Leventhal, MD    Thank you Sejal Aguilar MD for the opportunity to be involved in this patient's care. If you have any questions or concerns please feel free to contact me at [phone].

## 2019-04-02 NOTE — PROGRESS NOTES
Patient admitted to room from ED 2235. Bedside report received. Patient oriented to room, call light, bed rails, phone, lights and bathroom. Patient instructed about fire schedule of the day including:  Vital sign, frequency, lab draws, possible tests, frequency of MD and staff rounds. Patient instructed about precribed diet, how/when to call for meal service, and television. Telemetry box 116 in place, patient aware of placement and reason. Bed locked, in lowest position, side rails up 2/4, call light within reach.

## 2019-04-02 NOTE — ED NOTES
Pt had O2 off in room while sitting in bed. Pt 86% on room air.       Alexandria Ramos RN  04/01/19 2007

## 2019-04-02 NOTE — PROGRESS NOTES
Hospitalist Progress Note      PCP: Rajani Arevalo MD    Date of Admission: 4/1/2019    Chief Complaint: dyspnea, cough    Hospital Course:  81year old lady with h/o COPD presents with a few days of worsening dyspnea and cough. She was found to be hypoxic and wheezing and admitted overnight. Subjective:  Still wheezing profusely. Was 82% on RA this AM.  She is talking about leaving AMA. Medications:  Reviewed    Infusion Medications   Scheduled Medications    losartan  25 mg Oral Daily    amLODIPine  5 mg Oral Daily    atorvastatin  40 mg Oral Daily    sodium chloride flush  10 mL Intravenous 2 times per day    enoxaparin  40 mg Subcutaneous Daily    methylPREDNISolone  40 mg Intravenous Q6H    Followed by   Heydi Mendoza ON 4/4/2019] predniSONE  40 mg Oral Daily    cefTRIAXone (ROCEPHIN) IV  1 g Intravenous Q24H    azithromycin  500 mg Intravenous Q24H    ipratropium-albuterol  1 ampule Inhalation Q4H WA     PRN Meds: albuterol sulfate HFA, LORazepam, sodium chloride flush, magnesium hydroxide, ondansetron, acetaminophen    No intake or output data in the 24 hours ending 04/02/19 1144    Physical Exam Performed:    BP (!) 159/89   Pulse 82   Temp 98.1 °F (36.7 °C) (Oral)   Resp 18   Ht 5' 1\" (1.549 m)   Wt 91 lb 12.8 oz (41.6 kg)   SpO2 96%   BMI 17.35 kg/m²     General appearance: No apparent distress, appears stated age and cooperative. HEENT: Pupils equal, round, and reactive to light. Conjunctivae/corneas clear. Neck: Supple, with full range of motion. No jugular venous distention. Trachea midline. Respiratory:  Normal respiratory effort. Bilaterally without Rales/Rhonchi. Prominent bilateral expiratory wheezing. Cardiovascular: Regular rate and rhythm with normal S1/S2 without murmurs, rubs or gallops. Abdomen: Soft, non-tender, non-distended with normal bowel sounds. Musculoskeletal: No clubbing, cyanosis or edema bilaterally.   Full range of motion without deformity. Skin: Skin color, texture, turgor normal.  No rashes or lesions. Neurologic:  Neurovascularly intact without any focal sensory/motor deficits. Cranial nerves: II-XII intact, grossly non-focal.  Psychiatric: Alert and oriented, thought content appropriate, normal insight  Capillary Refill: Brisk,< 3 seconds   Peripheral Pulses: +2 palpable, equal bilaterally       Labs:   Recent Labs     04/01/19  1649   WBC 7.4   HGB 15.4   HCT 45.7        Recent Labs     04/01/19  1649   *   K 3.7   CL 89*   CO2 31   BUN 12   CREATININE <0.5*   CALCIUM 9.3     Recent Labs     04/01/19  1649   AST 24   ALT 13   BILITOT 0.5   ALKPHOS 58     No results for input(s): INR in the last 72 hours. Recent Labs     04/01/19  1649   TROPONINI <0.01       Urinalysis:      Lab Results   Component Value Date    NITRU Negative 04/01/2019    WBCUA 3-5 04/01/2019    BACTERIA 2+ 04/01/2019    RBCUA 3-5 04/01/2019    BLOODU Negative 04/01/2019    SPECGRAV >=1.030 04/01/2019    GLUCOSEU Negative 04/01/2019       Radiology:  XR CHEST STANDARD (2 VW)   Final Result   No acute process. Assessment/Plan:    Active Hospital Problems    Diagnosis Date Noted    [facility] [J44.9] 04/01/2019       81year old lady with h/o COPD presents with a few days of worsening dyspnea and cough. She was found to be hypoxic and wheezing and admitted overnight. AECOPD, with acute hypoxic respiratory failure  - steroids, inhaled bronchodilators  - was started on ceftriaxone and azithromycin on 4/1. F/u procalcitonin and rapid flu. - wean to RA as tolerated. The patient has no supplemental O2 requirement at baseline, but PCP records do not record O2 sats during these visits. HTN  - amlodipine, losartan. It looks like she is typically hypertensive as outpatient. HLD  - statin    Of note, the patient says that she only drinks 1-2 beers about 2x/week or less.   No withdrawal expected. DVT Prophylaxis: enoxaparin  Diet: DIET GENERAL;  Code Status: Full Code    PT/OT Eval Status: not indicated    Dispo - ready when respiratory status has improved, ideally when weaned to RA. I suspect this will be about 4/4. She can leave AMA if it comes to that. She lives at home.       Jermaine Mendiola MD

## 2019-04-02 NOTE — ED NOTES
Pt ambulated to bathroom on room air. HR up to 123 and O2 68 %. Dr Saray rosario.       Fredi Gallegos RN  04/01/19 0932

## 2019-04-02 NOTE — PROGRESS NOTES
RESPIRATORY THERAPY ASSESSMENT    Name:  Henri Sue  Medical Record Number:  [id number]  Age: 81 y.o. Gender: female  : 1950  Today's Date:  2019  Room:  0358/0358-01    Assessment       Patient Admission Diagnosis      Allergies  Allergies   Allergen Reactions    Sulfacetamide        Minimum Predicted Vital Capacity:     567          Actual Vital Capacity:      500              Pulmonary History:COPD  Home Oxygen Therapy:  room air  Home Respiratory Therapy: Albuterol MDI PRN, Spiriva Daily   Current Respiratory Therapy:  Duoneb Q4hwa, Albuterol MDI PRN  Treatment Type: HHN  Medications: Albuterol/Ipratropium    Respiratory Severity Index(RSI)   Patients with orders for inhalation medications, oxygen, or any therapeutic treatment modality will be placed on Respiratory Protocol. They will be assessed with the first treatment and at least every 72 hours thereafter. The following severity scale will be used to determine frequency of treatment intervention. Smoking History: Pulmonary Disease or Smoking History, Greater than 15 pack year = 2    Social History  Social History     Tobacco Use    Smoking status: Current Every Day Smoker     Packs/day: 0.50     Years: 30.00     Pack years: 15.00     Types: Cigarettes    Smokeless tobacco: Never Used   Substance Use Topics    Alcohol use: Yes     Comment: 2-4    Drug use: No       Recent Surgical History: None = 0  Past Surgical History  Past Surgical History:   Procedure Laterality Date    CATARACT REMOVAL WITH IMPLANT Right 2017    CATARACT REMOVAL WITH IMPLANT Left 2017       Level of Consciousness: Alert, Oriented, and Cooperative = 0    Level of Activity: Walking unassisted = 0    Respiratory Pattern: Dyspnea with exertion;Irregular pattern;or RR less than 6 = 2    Breath Sounds: Diminshed bilaterally and/or crackles = 2    Sputum  Sputum Color:  White,  , Sputum How Obtained: Cough on request  Cough: Strong, spontaneous, met  a. Incognizant or uncooperative          b. Patients treated with HHN at Home        c. Unable to demonstrate proper use of MDI with spacer     d. RR > 30 bpm   5. Bronchodilators will be delivered via Metered Dose Inhaler (MDI), HHN, Aerogen to intubated patients on mechanical ventilation. 6. Inhalation medication orders will be delivered and/or substituted as outlined below. Aerosolized Medications Ordering and Administration Guidelines:    1. All Medications will be ordered by a physician, and their frequency and/or modality will be adjusted as defined by the patients Respiratory Severity Index (RSI) score. 2. If the patient does not have documented COPD, consider discontinuing anticholinergics when RSI is less than 9.  3. If the bronchospasm worsens (increased RSI), then the bronchodilator frequency can be increased to a maximum of every 4 hours. If greater than every 4 hours is required, the physician will be contacted. 4. If the bronchospasm improves, the frequency of the bronchodilator can be decreased, based on the patient's RSI, but not less than home treatment regimen frequency. 5. Bronchodilator(s) will be discontinued if patient has a RSI less than 9 and has received no scheduled or as needed treatment for 72  Hrs. Patients Ordered on a Mucolytic Agent:    1. Must always be administered with a bronchodilator. 2. Discontinue if patient experiences worsened bronchospasm, or secretions have lessened to the point that the patient is able to clear them with a cough. Anti-inflammatory and Combination Medications:    1. If the patient lacks prior history of lung disease, is not using inhaled anti-inflammatory medication at home, and lacks wheezing by examination or by history for at least 24 hours, contact physician for possible discontinuation.

## 2019-04-02 NOTE — PROGRESS NOTES
Patient stated she was leaving AMA. Patient educated on why MD wants her to stay. Patient stated she understood and was going to leave. Patient's ride arrived and patient's friend was educated on patient's condition and her low oxygen levels. Friend and patient both insisted they were leaving. Patient's IV and tele removed. Patient signed paper stating she was leaving AMA. Patient then walked out with friend.  Marie Arellano

## 2019-05-09 ENCOUNTER — HOSPITAL ENCOUNTER (INPATIENT)
Age: 69
LOS: 4 days | Discharge: NURSING, REHAB OR LONG-TERM CARE | DRG: 190 | End: 2019-05-13
Attending: EMERGENCY MEDICINE | Admitting: INTERNAL MEDICINE
Payer: MEDICARE

## 2019-05-09 ENCOUNTER — APPOINTMENT (OUTPATIENT)
Dept: GENERAL RADIOLOGY | Age: 69
DRG: 190 | End: 2019-05-09
Payer: MEDICARE

## 2019-05-09 DIAGNOSIS — R09.02 HYPOXIA: ICD-10-CM

## 2019-05-09 DIAGNOSIS — J44.1 COPD EXACERBATION (HCC): Primary | ICD-10-CM

## 2019-05-09 PROBLEM — I10 HYPERTENSION, ESSENTIAL: Status: ACTIVE | Noted: 2019-05-09

## 2019-05-09 PROBLEM — Z72.0 TOBACCO ABUSE: Status: ACTIVE | Noted: 2019-05-09

## 2019-05-09 LAB
A/G RATIO: 1.8 (ref 1.1–2.2)
ALBUMIN SERPL-MCNC: 4 G/DL (ref 3.4–5)
ALP BLD-CCNC: 53 U/L (ref 40–129)
ALT SERPL-CCNC: 9 U/L (ref 10–40)
ANION GAP SERPL CALCULATED.3IONS-SCNC: 11 MMOL/L (ref 3–16)
ANISOCYTOSIS: ABNORMAL
AST SERPL-CCNC: 17 U/L (ref 15–37)
ATYPICAL LYMPHOCYTE RELATIVE PERCENT: 2 % (ref 0–6)
BANDED NEUTROPHILS RELATIVE PERCENT: 11 % (ref 0–7)
BASOPHILS ABSOLUTE: 0 K/UL (ref 0–0.2)
BASOPHILS RELATIVE PERCENT: 0 %
BILIRUB SERPL-MCNC: 0.4 MG/DL (ref 0–1)
BUN BLDV-MCNC: 16 MG/DL (ref 7–20)
CALCIUM SERPL-MCNC: 9.6 MG/DL (ref 8.3–10.6)
CHLORIDE BLD-SCNC: 97 MMOL/L (ref 99–110)
CO2: 33 MMOL/L (ref 21–32)
CREAT SERPL-MCNC: 0.8 MG/DL (ref 0.6–1.2)
EKG ATRIAL RATE: 75 BPM
EKG DIAGNOSIS: NORMAL
EKG P AXIS: 55 DEGREES
EKG P-R INTERVAL: 140 MS
EKG Q-T INTERVAL: 382 MS
EKG QRS DURATION: 66 MS
EKG QTC CALCULATION (BAZETT): 426 MS
EKG R AXIS: -25 DEGREES
EKG T AXIS: 19 DEGREES
EKG VENTRICULAR RATE: 75 BPM
EOSINOPHILS ABSOLUTE: 0.1 K/UL (ref 0–0.6)
EOSINOPHILS RELATIVE PERCENT: 1 %
GFR AFRICAN AMERICAN: >60
GFR NON-AFRICAN AMERICAN: >60
GLOBULIN: 2.2 G/DL
GLUCOSE BLD-MCNC: 106 MG/DL (ref 70–99)
HCT VFR BLD CALC: 43.7 % (ref 36–48)
HEMOGLOBIN: 14.6 G/DL (ref 12–16)
LACTIC ACID: 0.6 MMOL/L (ref 0.4–2)
LYMPHOCYTES ABSOLUTE: 1.7 K/UL (ref 1–5.1)
LYMPHOCYTES RELATIVE PERCENT: 23 %
MCH RBC QN AUTO: 30.3 PG (ref 26–34)
MCHC RBC AUTO-ENTMCNC: 33.4 G/DL (ref 31–36)
MCV RBC AUTO: 90.7 FL (ref 80–100)
MONOCYTES ABSOLUTE: 0.1 K/UL (ref 0–1.3)
MONOCYTES RELATIVE PERCENT: 2 %
NEUTROPHILS ABSOLUTE: 4.9 K/UL (ref 1.7–7.7)
NEUTROPHILS RELATIVE PERCENT: 61 %
PDW BLD-RTO: 14.5 % (ref 12.4–15.4)
PLATELET # BLD: 182 K/UL (ref 135–450)
PLATELET SLIDE REVIEW: ADEQUATE
PMV BLD AUTO: 8.5 FL (ref 5–10.5)
POIKILOCYTES: ABNORMAL
POTASSIUM SERPL-SCNC: 4.1 MMOL/L (ref 3.5–5.1)
PROCALCITONIN: 0.04 NG/ML (ref 0–0.15)
RBC # BLD: 4.82 M/UL (ref 4–5.2)
SCHISTOCYTES: ABNORMAL
SODIUM BLD-SCNC: 141 MMOL/L (ref 136–145)
TOTAL PROTEIN: 6.2 G/DL (ref 6.4–8.2)
TROPONIN: <0.01 NG/ML
WBC # BLD: 6.8 K/UL (ref 4–11)

## 2019-05-09 PROCEDURE — 85025 COMPLETE CBC W/AUTO DIFF WBC: CPT

## 2019-05-09 PROCEDURE — 94645 CONT INHLJ TX EACH ADDL HOUR: CPT

## 2019-05-09 PROCEDURE — 83605 ASSAY OF LACTIC ACID: CPT

## 2019-05-09 PROCEDURE — 6370000000 HC RX 637 (ALT 250 FOR IP): Performed by: INTERNAL MEDICINE

## 2019-05-09 PROCEDURE — 94644 CONT INHLJ TX 1ST HOUR: CPT

## 2019-05-09 PROCEDURE — 93010 ELECTROCARDIOGRAM REPORT: CPT | Performed by: INTERNAL MEDICINE

## 2019-05-09 PROCEDURE — 2580000003 HC RX 258: Performed by: INTERNAL MEDICINE

## 2019-05-09 PROCEDURE — 98960 EDU&TRN PT SELF-MGMT NQHP 1: CPT

## 2019-05-09 PROCEDURE — 6370000000 HC RX 637 (ALT 250 FOR IP): Performed by: EMERGENCY MEDICINE

## 2019-05-09 PROCEDURE — 94640 AIRWAY INHALATION TREATMENT: CPT

## 2019-05-09 PROCEDURE — 93005 ELECTROCARDIOGRAM TRACING: CPT | Performed by: EMERGENCY MEDICINE

## 2019-05-09 PROCEDURE — 1200000000 HC SEMI PRIVATE

## 2019-05-09 PROCEDURE — 80053 COMPREHEN METABOLIC PANEL: CPT

## 2019-05-09 PROCEDURE — 71046 X-RAY EXAM CHEST 2 VIEWS: CPT

## 2019-05-09 PROCEDURE — 94150 VITAL CAPACITY TEST: CPT

## 2019-05-09 PROCEDURE — 94761 N-INVAS EAR/PLS OXIMETRY MLT: CPT

## 2019-05-09 PROCEDURE — 2700000000 HC OXYGEN THERAPY PER DAY

## 2019-05-09 PROCEDURE — 36415 COLL VENOUS BLD VENIPUNCTURE: CPT

## 2019-05-09 PROCEDURE — 82746 ASSAY OF FOLIC ACID SERUM: CPT

## 2019-05-09 PROCEDURE — 84484 ASSAY OF TROPONIN QUANT: CPT

## 2019-05-09 PROCEDURE — 99291 CRITICAL CARE FIRST HOUR: CPT

## 2019-05-09 PROCEDURE — 84145 PROCALCITONIN (PCT): CPT

## 2019-05-09 PROCEDURE — 82607 VITAMIN B-12: CPT

## 2019-05-09 PROCEDURE — 6360000002 HC RX W HCPCS: Performed by: EMERGENCY MEDICINE

## 2019-05-09 RX ORDER — LOSARTAN POTASSIUM 25 MG/1
25 TABLET ORAL DAILY
Status: DISCONTINUED | OUTPATIENT
Start: 2019-05-09 | End: 2019-05-13 | Stop reason: HOSPADM

## 2019-05-09 RX ORDER — ATORVASTATIN CALCIUM 40 MG/1
40 TABLET, FILM COATED ORAL DAILY
Status: DISCONTINUED | OUTPATIENT
Start: 2019-05-10 | End: 2019-05-13 | Stop reason: HOSPADM

## 2019-05-09 RX ORDER — SODIUM CHLORIDE 0.9 % (FLUSH) 0.9 %
10 SYRINGE (ML) INJECTION PRN
Status: DISCONTINUED | OUTPATIENT
Start: 2019-05-09 | End: 2019-05-13 | Stop reason: HOSPADM

## 2019-05-09 RX ORDER — PREDNISONE 20 MG/1
60 TABLET ORAL ONCE
Status: COMPLETED | OUTPATIENT
Start: 2019-05-09 | End: 2019-05-09

## 2019-05-09 RX ORDER — ALBUTEROL SULFATE 2.5 MG/3ML
2.5 SOLUTION RESPIRATORY (INHALATION)
Status: DISCONTINUED | OUTPATIENT
Start: 2019-05-09 | End: 2019-05-13 | Stop reason: HOSPADM

## 2019-05-09 RX ORDER — AMLODIPINE BESYLATE 5 MG/1
5 TABLET ORAL DAILY
Status: DISCONTINUED | OUTPATIENT
Start: 2019-05-09 | End: 2019-05-10

## 2019-05-09 RX ORDER — ONDANSETRON 2 MG/ML
4 INJECTION INTRAMUSCULAR; INTRAVENOUS EVERY 6 HOURS PRN
Status: DISCONTINUED | OUTPATIENT
Start: 2019-05-09 | End: 2019-05-13 | Stop reason: HOSPADM

## 2019-05-09 RX ORDER — IPRATROPIUM BROMIDE AND ALBUTEROL SULFATE 2.5; .5 MG/3ML; MG/3ML
1 SOLUTION RESPIRATORY (INHALATION) EVERY 4 HOURS
Status: DISCONTINUED | OUTPATIENT
Start: 2019-05-10 | End: 2019-05-10

## 2019-05-09 RX ORDER — NICOTINE 21 MG/24HR
1 PATCH, TRANSDERMAL 24 HOURS TRANSDERMAL DAILY
Status: DISCONTINUED | OUTPATIENT
Start: 2019-05-09 | End: 2019-05-13 | Stop reason: HOSPADM

## 2019-05-09 RX ORDER — SODIUM CHLORIDE 0.9 % (FLUSH) 0.9 %
10 SYRINGE (ML) INJECTION EVERY 12 HOURS SCHEDULED
Status: DISCONTINUED | OUTPATIENT
Start: 2019-05-09 | End: 2019-05-13 | Stop reason: HOSPADM

## 2019-05-09 RX ORDER — IPRATROPIUM BROMIDE AND ALBUTEROL SULFATE 2.5; .5 MG/3ML; MG/3ML
3 SOLUTION RESPIRATORY (INHALATION) ONCE
Status: COMPLETED | OUTPATIENT
Start: 2019-05-09 | End: 2019-05-09

## 2019-05-09 RX ORDER — IPRATROPIUM BROMIDE AND ALBUTEROL SULFATE 2.5; .5 MG/3ML; MG/3ML
1 SOLUTION RESPIRATORY (INHALATION)
Status: DISCONTINUED | OUTPATIENT
Start: 2019-05-10 | End: 2019-05-09

## 2019-05-09 RX ORDER — PREDNISONE 20 MG/1
40 TABLET ORAL DAILY
Status: DISCONTINUED | OUTPATIENT
Start: 2019-05-10 | End: 2019-05-11

## 2019-05-09 RX ADMIN — SODIUM CHLORIDE, PRESERVATIVE FREE 10 ML: 5 INJECTION INTRAVENOUS at 20:54

## 2019-05-09 RX ADMIN — PREDNISONE 60 MG: 20 TABLET ORAL at 16:09

## 2019-05-09 RX ADMIN — AMLODIPINE BESYLATE 5 MG: 5 TABLET ORAL at 20:56

## 2019-05-09 RX ADMIN — LOSARTAN POTASSIUM 25 MG: 25 TABLET, FILM COATED ORAL at 20:56

## 2019-05-09 RX ADMIN — ALBUTEROL SULFATE 5 MG: 2.5 SOLUTION RESPIRATORY (INHALATION) at 17:08

## 2019-05-09 RX ADMIN — IPRATROPIUM BROMIDE AND ALBUTEROL SULFATE 3 AMPULE: .5; 3 SOLUTION RESPIRATORY (INHALATION) at 15:47

## 2019-05-09 RX ADMIN — IPRATROPIUM BROMIDE AND ALBUTEROL SULFATE 1 AMPULE: .5; 3 SOLUTION RESPIRATORY (INHALATION) at 20:27

## 2019-05-09 ASSESSMENT — ENCOUNTER SYMPTOMS
WHEEZING: 0
COUGH: 0
TROUBLE SWALLOWING: 0
STRIDOR: 0
FACIAL SWELLING: 0
COLOR CHANGE: 0
ABDOMINAL PAIN: 0
NAUSEA: 0
SHORTNESS OF BREATH: 1
VOICE CHANGE: 0
VOMITING: 0

## 2019-05-09 ASSESSMENT — PAIN SCALES - GENERAL: PAINLEVEL_OUTOF10: 0

## 2019-05-09 NOTE — ED NOTES
Assisted pt up to bedside commode. Pt removed oxygen to ambulate to BSC; spo2 dropped to 72% on RA. Assisted pt back into bed and placed pt back on oxygen; pt o2 sat 93% on 2L at this time.       Keila Sheffield RN  05/09/19 7336

## 2019-05-09 NOTE — ED NOTES
Dr Maddi Mohamud at bedside updating pt on results and Harshad Cronin., [facility]  05/09/19 7646

## 2019-05-09 NOTE — PROGRESS NOTES
05/09/19 1548   Oxygen Therapy/Pulse Ox   O2 Therapy Oxygen   $Oxygen $Daily Charge   O2 Device Nasal cannula   O2 Flow Rate (L/min) 3 L/min  (decreased to 2 LPM at this time.)   Resp 24   SpO2 97 %   $Pulse Oximeter $Spot check (multiple/continuous)   Treatment   Treatment Type HHN   $Treatment Type $Continuous Neb 1st Hour   Medications Albuterol/Ipratropium  (DuoNeb x3)   Is patient on O2? Y   Pre-Tx Pulse 78   Pre-Tx Resps 18   Breath Sounds Pre-Tx Left Diminished; Expiratory wheeze   Breath Sounds Pre-Tx Right Diminished; Expiratory wheeze   Breath Sounds Post-Tx Left Diminished; Expiratory wheeze   Breath Sounds Post-Tx Right Diminished; Expiratory wheeze   Post-Tx Pulse 78   Post-Tx Resps 18   Delivery Source Air   Position Semi-Plascencia's   Tx Tolerance Well

## 2019-05-09 NOTE — ED PROVIDER NOTES
[facility]  ED  eMERGENCY dEPARTMENT eNCOUnter      Pt Name: Jairo Barboza  MRN: [id number]  Sanggfrikki 1950  Date of evaluation: 5/9/2019  Provider: Jerry Rosales MD    CHIEF COMPLAINT       Chief Complaint   Patient presents with    Shortness of Breath     SOB for a couple weeks. [facility] at [facility], O2 was 80% on RA, denies home O2 use. O2 96% on 3L NC.          HISTORY OF PRESENT ILLNESS   (Location/Symptom, Timing/Onset, Context/Setting, Quality, Duration, Modifying Factors, Severity)  Note limiting factors. The history is provided by the patient. Shortness of Breath   Severity:  Severe  Onset quality:  Gradual  Duration:  2 weeks  Timing:  Constant  Progression:  Worsening  Chronicity:  Recurrent  Context comment:  Hx of COPD. Patient has not used her inhaler today. Relieved by:  None tried  Worsened by:  Nothing  Ineffective treatments:  None tried  Associated symptoms: no abdominal pain, no chest pain, no cough, no fever, no neck pain, no vomiting and no wheezing    Risk factors: no hx of PE/DVT and no obesity        Nursing Notes were reviewed. REVIEW OFSYSTEMS    (2-9 systems for level 4, 10 or more for level 5)     Review of Systems   Constitutional: Negative for appetite change, fever and unexpected weight change. HENT: Negative for facial swelling, trouble swallowing and voice change. Eyes: Negative for visual disturbance. Respiratory: Positive for shortness of breath. Negative for cough, wheezing and stridor. Cardiovascular: Negative for chest pain and palpitations. Gastrointestinal: Negative for abdominal pain, nausea and vomiting. Genitourinary: Negative for dysuria and vaginal bleeding. Musculoskeletal: Negative for neck pain and neck stiffness. Skin: Negative for color change and wound. Neurological: Negative for seizures and syncope. Psychiatric/Behavioral: Negative for self-injury and suicidal ideas.        Except as noted above the drinking     Drug use: No    Sexual activity: Not on file   Lifestyle    Physical activity:     Days per week: Not on file     Minutes per session: Not on file    Stress: Not on file   Relationships    Social connections:     Talks on phone: Not on file     Gets together: Not on file     Attends Christian service: Not on file     Active member of club or organization: Not on file     Attends meetings of clubs or organizations: Not on file     Relationship status: Not on file    Intimate partner violence:     Fear of current or ex partner: Not on file     Emotionally abused: Not on file     Physically abused: Not on file     Forced sexual activity: Not on file   Other Topics Concern    Not on file   Social History Narrative    Not on file         PHYSICAL EXAM    (up to 7 for level 4, 8 or more for level 5)     ED Triage Vitals [05/09/19 1407]   BP Temp Temp Source Pulse Resp SpO2 Height Weight   (!) 194/104 98 °F (36.7 °C) Oral 77 16 98 % -- --       Physical Exam   Constitutional: She appears well-developed and well-nourished. HENT:   Head: Normocephalic and atraumatic. Right Ear: External ear normal.   Left Ear: External ear normal.   Eyes: Conjunctivae and EOM are normal.   Neck: Neck supple. No JVD present. No tracheal deviation present. Cardiovascular: Normal rate and intact distal pulses. Pulmonary/Chest:   Reduced air movement diffusely. Prolonged expiratory phase. No focal lung sounds appreciated on initial exam   Abdominal: Soft. She exhibits no distension. There is no tenderness. There is no rebound and no guarding. Musculoskeletal: Normal range of motion. She exhibits no tenderness or deformity. Neurological: She is alert. No cranial nerve deficit. Skin: Skin is warm and dry. She is not diaphoretic. Nursing note and vitals reviewed.       DIAGNOSTIC RESULTS     EKG:All EKG's are interpreted by the Emergency Department Physician who either signs or Co-signs this chart in the

## 2019-05-09 NOTE — H&P
(SPIRIVA) 18 MCG inhalation capsule Inhale 1 capsule into the lungs daily 4/18/17  Yes Fantasma Aponte MD   amLODIPine (NORVASC) 5 MG tablet Take 5 mg by mouth daily 1/10/17  Yes Historical Provider, MD   atorvastatin (LIPITOR) 40 MG tablet Take 40 mg by mouth daily. Yes Historical Provider, MD       Allergies:  Sulfacetamide    Social History:      The patient currently lives at home    TOBACCO:   reports that she has been smoking cigarettes. She has a 7.50 pack-year smoking history. She has never used smokeless tobacco.  ETOH:   reports that she drinks alcohol. Family History:      Reviewed in detail and negative for DM, CAD, Cancer, CVA. Positive as follows:        Problem Relation Age of Onset    High Cholesterol Mother     High Blood Pressure Father        REVIEW OF SYSTEMS:   Pertinent positives as noted in the HPI. Progressive shortness of breath. All other systems reviewed and negative. PHYSICAL EXAM PERFORMED:    BP (!) 151/75   Pulse 92   Temp 98 °F (36.7 °C) (Oral)   Resp 23   Ht 5' 2\" (1.575 m)   Wt 82 lb (37.2 kg)   SpO2 93%   BMI 15.00 kg/m²     General appearance:  No apparent distress, appears stated age and cooperative. HEENT:  Normal cephalic, atraumatic without obvious deformity. Pupils equal, round, and reactive to light. Extra ocular muscles intact. Conjunctivae/corneas clear. Neck: Supple, with full range of motion. No jugular venous distention. Trachea midline. Respiratory:  Normal respiratory effort. Bilateral posterior wheezes, expiratory  Cardiovascular:  Regular rate and rhythm with normal S1/S2 without murmurs, rubs or gallops. Abdomen: Soft, non-tender, non-distended with normal bowel sounds. Musculoskeletal: Noted upper extremity clubbing, no cyanosis or edema bilaterally. Full range of motion without deformity. Skin: Skin color, texture, turgor normal.  No rashes or lesions. Neurologic:  Neurovascularly intact without any focal sensory/motor deficits. Cranial nerves: II-XII intact, grossly non-focal.  Psychiatric:  Alert and oriented, thought content appropriate, normal insight  Capillary Refill: Brisk,< 3 seconds   Peripheral Pulses: +2 palpable, equal bilaterally       Labs:     Recent Labs     05/09/19  1420   WBC 6.8   HGB 14.6   HCT 43.7        Recent Labs     05/09/19  1420      K 4.1   CL 97*   CO2 33*   BUN 16   CREATININE 0.8   CALCIUM 9.6     Recent Labs     05/09/19  1420   AST 17   ALT 9*   BILITOT 0.4   ALKPHOS 53     No results for input(s): INR in the last 72 hours. Recent Labs     05/09/19  1420   TROPONINI <0.01       Urinalysis:      Lab Results   Component Value Date    NITRU Negative 04/01/2019    WBCUA 3-5 04/01/2019    BACTERIA 2+ 04/01/2019    RBCUA 3-5 04/01/2019    BLOODU Negative 04/01/2019    SPECGRAV >=1.030 04/01/2019    GLUCOSEU Negative 04/01/2019       Radiology:     CXR: I have reviewed the CXR with the following interpretation: No acute infiltrates  EKG:  I have reviewed the EKG with the following interpretation: Normal sinus rhythm, no acute or chronic ischemia    XR CHEST STANDARD (2 VW)   Final Result   1. Enlargement of the pulmonary arteries proximally bilaterally could be   related to pulmonary arterial hypertension   2. Otherwise, stable chest             ASSESSMENT:    Active Hospital Problems    Diagnosis Date Noted    COPD exacerbation ([facility] [address] [J44.1] 05/09/2019    Hypertension, essential [I10] 05/09/2019    Hypoxemia [R09.02] 05/09/2019    Tobacco abuse [Z72.0] 05/09/2019         PLAN:    COPD exacerbation  Started on nebulizers and oral steroids.   Smoking certainly plays a significant role with the patient's COPD    Hypertension  Continue home antihypertensives  If blood pressure still remains elevated despite patient's conditions improvement, we may need to revise antihypertensive medications    Hypoxemia  Started on oxygen supplementation  Does not seem to have acute respiratory failure at the time of arrival  Monitor. There is certainly a possibility that patient may need home oxygen at the time of discharge. Tobacco abuse  Smoking cessation advice provided  Nicotine patch prescribed    Discussed with patient, questions answered    DVT Prophylaxis: Lovenox  Diet: Regular diet  Code Status: Full code    PT/OT Eval Status: Pending    Dispo - inpatient, 2-3 days       Sabino Hollis MD    Thank you Minerva Cole MD for the opportunity to be involved in this patient's care. If you have any questions or concerns please feel free to contact me at [phone].

## 2019-05-09 NOTE — PROGRESS NOTES
05/09/19 1708   Oxygen Therapy/Pulse Ox   O2 Therapy Oxygen   O2 Device Nasal cannula   O2 Flow Rate (L/min) 2 L/min   Resp 26   Treatment   Treatment Type HHN   $Treatment Type $Continuous Neb Each Additional Hour   Medications Albuterol   Is patient on O2?  Y   Pre-Tx Pulse 92   Pre-Tx Resps 18   Breath Sounds Pre-Tx Left Diminished   Breath Sounds Pre-Tx Right Diminished   Breath Sounds Post-Tx Left Diminished   Breath Sounds Post-Tx Right Diminished   Post-Tx Pulse 92   Post-Tx Resps 18   Delivery Source Air   Position Semi-Plascencia's   Tx Tolerance Well

## 2019-05-10 ENCOUNTER — APPOINTMENT (OUTPATIENT)
Dept: CT IMAGING | Age: 69
DRG: 190 | End: 2019-05-10
Payer: MEDICARE

## 2019-05-10 PROBLEM — E43 SEVERE MALNUTRITION (HCC): Chronic | Status: ACTIVE | Noted: 2019-05-10

## 2019-05-10 LAB
A/G RATIO: 1.6 (ref 1.1–2.2)
ALBUMIN SERPL-MCNC: 4.1 G/DL (ref 3.4–5)
ALP BLD-CCNC: 52 U/L (ref 40–129)
ALT SERPL-CCNC: 9 U/L (ref 10–40)
ANION GAP SERPL CALCULATED.3IONS-SCNC: 18 MMOL/L (ref 3–16)
AST SERPL-CCNC: 14 U/L (ref 15–37)
BASOPHILS ABSOLUTE: 0 K/UL (ref 0–0.2)
BASOPHILS RELATIVE PERCENT: 0.7 %
BILIRUB SERPL-MCNC: 0.3 MG/DL (ref 0–1)
BUN BLDV-MCNC: 8 MG/DL (ref 7–20)
CALCIUM SERPL-MCNC: 9.2 MG/DL (ref 8.3–10.6)
CHLORIDE BLD-SCNC: 96 MMOL/L (ref 99–110)
CO2: 25 MMOL/L (ref 21–32)
CREAT SERPL-MCNC: <0.5 MG/DL (ref 0.6–1.2)
EOSINOPHILS ABSOLUTE: 0 K/UL (ref 0–0.6)
EOSINOPHILS RELATIVE PERCENT: 0.1 %
GFR AFRICAN AMERICAN: >60
GFR NON-AFRICAN AMERICAN: >60
GLOBULIN: 2.5 G/DL
GLUCOSE BLD-MCNC: 104 MG/DL (ref 70–99)
GLUCOSE BLD-MCNC: 123 MG/DL (ref 70–99)
GLUCOSE BLD-MCNC: 149 MG/DL (ref 70–99)
GLUCOSE BLD-MCNC: 275 MG/DL (ref 70–99)
HCT VFR BLD CALC: 42 % (ref 36–48)
HEMOGLOBIN: 14 G/DL (ref 12–16)
LYMPHOCYTES ABSOLUTE: 0.3 K/UL (ref 1–5.1)
LYMPHOCYTES RELATIVE PERCENT: 5.1 %
MAGNESIUM: 1.9 MG/DL (ref 1.8–2.4)
MCH RBC QN AUTO: 30.3 PG (ref 26–34)
MCHC RBC AUTO-ENTMCNC: 33.4 G/DL (ref 31–36)
MCV RBC AUTO: 90.8 FL (ref 80–100)
MONOCYTES ABSOLUTE: 0.3 K/UL (ref 0–1.3)
MONOCYTES RELATIVE PERCENT: 4.1 %
NEUTROPHILS ABSOLUTE: 5.7 K/UL (ref 1.7–7.7)
NEUTROPHILS RELATIVE PERCENT: 90 %
PDW BLD-RTO: 14.6 % (ref 12.4–15.4)
PERFORMED ON: ABNORMAL
PLATELET # BLD: 176 K/UL (ref 135–450)
PMV BLD AUTO: 8.4 FL (ref 5–10.5)
POTASSIUM REFLEX MAGNESIUM: 3.2 MMOL/L (ref 3.5–5.1)
RBC # BLD: 4.62 M/UL (ref 4–5.2)
SODIUM BLD-SCNC: 139 MMOL/L (ref 136–145)
TOTAL PROTEIN: 6.6 G/DL (ref 6.4–8.2)
WBC # BLD: 6.3 K/UL (ref 4–11)

## 2019-05-10 PROCEDURE — 99223 1ST HOSP IP/OBS HIGH 75: CPT | Performed by: INTERNAL MEDICINE

## 2019-05-10 PROCEDURE — 83036 HEMOGLOBIN GLYCOSYLATED A1C: CPT

## 2019-05-10 PROCEDURE — 6370000000 HC RX 637 (ALT 250 FOR IP): Performed by: INTERNAL MEDICINE

## 2019-05-10 PROCEDURE — 2700000000 HC OXYGEN THERAPY PER DAY

## 2019-05-10 PROCEDURE — 94761 N-INVAS EAR/PLS OXIMETRY MLT: CPT

## 2019-05-10 PROCEDURE — 71250 CT THORAX DX C-: CPT

## 2019-05-10 PROCEDURE — 83735 ASSAY OF MAGNESIUM: CPT

## 2019-05-10 PROCEDURE — 2580000003 HC RX 258: Performed by: INTERNAL MEDICINE

## 2019-05-10 PROCEDURE — 6370000000 HC RX 637 (ALT 250 FOR IP): Performed by: REGISTERED NURSE

## 2019-05-10 PROCEDURE — 1200000000 HC SEMI PRIVATE

## 2019-05-10 PROCEDURE — 80053 COMPREHEN METABOLIC PANEL: CPT

## 2019-05-10 PROCEDURE — 36415 COLL VENOUS BLD VENIPUNCTURE: CPT

## 2019-05-10 PROCEDURE — 94640 AIRWAY INHALATION TREATMENT: CPT

## 2019-05-10 PROCEDURE — 85025 COMPLETE CBC W/AUTO DIFF WBC: CPT

## 2019-05-10 PROCEDURE — 94150 VITAL CAPACITY TEST: CPT

## 2019-05-10 RX ORDER — DEXTROSE MONOHYDRATE 25 G/50ML
12.5 INJECTION, SOLUTION INTRAVENOUS PRN
Status: DISCONTINUED | OUTPATIENT
Start: 2019-05-10 | End: 2019-05-13 | Stop reason: HOSPADM

## 2019-05-10 RX ORDER — AMLODIPINE BESYLATE 5 MG/1
10 TABLET ORAL DAILY
Status: DISCONTINUED | OUTPATIENT
Start: 2019-05-11 | End: 2019-05-13 | Stop reason: HOSPADM

## 2019-05-10 RX ORDER — DEXTROSE MONOHYDRATE 50 MG/ML
100 INJECTION, SOLUTION INTRAVENOUS PRN
Status: DISCONTINUED | OUTPATIENT
Start: 2019-05-10 | End: 2019-05-13 | Stop reason: HOSPADM

## 2019-05-10 RX ORDER — IPRATROPIUM BROMIDE AND ALBUTEROL SULFATE 2.5; .5 MG/3ML; MG/3ML
1 SOLUTION RESPIRATORY (INHALATION) 3 TIMES DAILY
Status: DISCONTINUED | OUTPATIENT
Start: 2019-05-11 | End: 2019-05-13 | Stop reason: HOSPADM

## 2019-05-10 RX ORDER — POTASSIUM CHLORIDE 20 MEQ/1
40 TABLET, EXTENDED RELEASE ORAL ONCE
Status: COMPLETED | OUTPATIENT
Start: 2019-05-10 | End: 2019-05-10

## 2019-05-10 RX ORDER — NICOTINE POLACRILEX 4 MG
15 LOZENGE BUCCAL PRN
Status: DISCONTINUED | OUTPATIENT
Start: 2019-05-10 | End: 2019-05-13 | Stop reason: HOSPADM

## 2019-05-10 RX ADMIN — IPRATROPIUM BROMIDE AND ALBUTEROL SULFATE 1 AMPULE: .5; 3 SOLUTION RESPIRATORY (INHALATION) at 00:07

## 2019-05-10 RX ADMIN — SODIUM CHLORIDE, PRESERVATIVE FREE 10 ML: 5 INJECTION INTRAVENOUS at 08:37

## 2019-05-10 RX ADMIN — AMLODIPINE BESYLATE 5 MG: 5 TABLET ORAL at 08:36

## 2019-05-10 RX ADMIN — IPRATROPIUM BROMIDE AND ALBUTEROL SULFATE 1 AMPULE: .5; 3 SOLUTION RESPIRATORY (INHALATION) at 11:33

## 2019-05-10 RX ADMIN — SODIUM CHLORIDE, PRESERVATIVE FREE 10 ML: 5 INJECTION INTRAVENOUS at 21:30

## 2019-05-10 RX ADMIN — POTASSIUM CHLORIDE 40 MEQ: 20 TABLET, EXTENDED RELEASE ORAL at 11:42

## 2019-05-10 RX ADMIN — IPRATROPIUM BROMIDE AND ALBUTEROL SULFATE 1 AMPULE: .5; 3 SOLUTION RESPIRATORY (INHALATION) at 03:55

## 2019-05-10 RX ADMIN — LOSARTAN POTASSIUM 25 MG: 25 TABLET, FILM COATED ORAL at 08:36

## 2019-05-10 RX ADMIN — ATORVASTATIN CALCIUM 40 MG: 40 TABLET, FILM COATED ORAL at 08:36

## 2019-05-10 RX ADMIN — IPRATROPIUM BROMIDE AND ALBUTEROL SULFATE 1 AMPULE: .5; 3 SOLUTION RESPIRATORY (INHALATION) at 19:50

## 2019-05-10 RX ADMIN — PREDNISONE 40 MG: 20 TABLET ORAL at 08:36

## 2019-05-10 RX ADMIN — IPRATROPIUM BROMIDE AND ALBUTEROL SULFATE 1 AMPULE: .5; 3 SOLUTION RESPIRATORY (INHALATION) at 07:53

## 2019-05-10 RX ADMIN — INSULIN LISPRO 1 UNITS: 100 INJECTION, SOLUTION INTRAVENOUS; SUBCUTANEOUS at 21:02

## 2019-05-10 ASSESSMENT — PAIN SCALES - GENERAL
PAINLEVEL_OUTOF10: 0
PAINLEVEL_OUTOF10: 0

## 2019-05-10 NOTE — CARE COORDINATION
CASE MANAGEMENT INITIAL ASSESSMENT      Reviewed chart and met with patient today, re: 81 y.o. female  Explained Case Management role/services. Family present: none  Primary contact information: Hiren Mosher    Admit date/status: 5/9/19  Diagnosis: COPD    Insurance: [facility]  Precert required for SNF - Y       3 night stay required - N    Living arrangements, Adls, care needs, prior to admission: lives in [facility] alone with dog    Transportation: tbd    [address] [place] at home: Walker__Cane__RTS__ BSC__Shower Chair__  02__ HHN__ CPAP__  BiPap__  Hospital Bed__ W/C___ Other__________    Services in the home and/or outpatient, prior to admission: none      PT/OT recs: none    Hospital Exemption Notification (HEN): needed for SNF    Barriers to discharge: none    Plan/comments: Patient plans on returning home at discharge. Reports was [employer]PT[employer] and drives. CM will follow for possible new nebulizer needs.  Yee Ferrell RN      ECOC on chart for MD signature

## 2019-05-10 NOTE — PROGRESS NOTES
Hospitalist Progress Note      PCP: Johan Meng MD    Date of Admission: 5/9/2019    Chief Complaint: Progressive SOB    Hospital Course:   81 y.o. female who presented to [facility] with a 2 week course of progressive shortness of breath with cough. The shortness of breath has been getting worse with exertion. Initially it was mild. Over the course of 2 weeks it became severe. Today patient was unable to take care of herself. Went to urgent care. Pulse ox was found to be 80% on room air. Patient is not on oxygen at her baseline. Was started on oxygen and sent to the emergency room. No chest pain, no nausea, no vomiting. Nothing that makes it better or worse except that exertion makes complaints worse and rest makes it better. Never had anything like this before. No other complaints that accompany the shortness of breath. Subjective:   Pt is on RA. Afebrile. Hypertensive. She denies dyspnea or chest pain. No N/V/D. No family at bedside.      Medications:  Reviewed    Infusion Medications    dextrose       Scheduled Medications    insulin lispro  0-12 Units Subcutaneous TID WC    insulin lispro  0-6 Units Subcutaneous Nightly    amLODIPine  5 mg Oral Daily    atorvastatin  40 mg Oral Daily    losartan  25 mg Oral Daily    sodium chloride flush  10 mL Intravenous 2 times per day    enoxaparin  40 mg Subcutaneous Daily    nicotine  1 patch Transdermal Daily    predniSONE  40 mg Oral Daily    ipratropium-albuterol  1 ampule Inhalation Q4H     PRN Meds: glucose, dextrose, glucagon (rDNA), dextrose, sodium chloride flush, magnesium hydroxide, ondansetron, albuterol      Intake/Output Summary (Last 24 hours) at 5/10/2019 1343  Last data filed at 5/10/2019 0441  Gross per 24 hour   Intake 960 ml   Output --   Net 960 ml       Physical Exam Performed:    BP (!) 170/67   Pulse 98   Temp 98.2 °F (36.8 °C) (Oral)   Resp 14   Ht 5' 2\" (1.575 m)   Wt 82 lb (37.2 kg)   SpO2 91%   BMI 15.00 kg/m² General appearance: Thin/frail female in no apparent distress, appears stated age and cooperative. HEENT: Pupils equal, round, and reactive to light. Conjunctivae/corneas clear. Neck: Supple, with full range of motion. No jugular venous distention. Trachea midline. Respiratory:  Normal respiratory effort. Poor air movement. Cardiovascular: Regular rate and rhythm with normal S1/S2 without murmurs, rubs or gallops. Abdomen: Soft, non-tender, non-distended with normal bowel sounds. Musculoskeletal: No clubbing, cyanosis or edema bilaterally. Full range of motion without deformity. Skin: Skin color, texture, turgor normal.  No rashes or lesions. Neurologic:  Neurovascularly intact without any focal sensory/motor deficits. Cranial nerves: II-XII intact, grossly non-focal.  Psychiatric: Alert and oriented to self, year/month, limited insight   Capillary Refill: Brisk,< 3 seconds   Peripheral Pulses: +2 palpable, equal bilaterally       Labs:   Recent Labs     05/09/19  1420 05/10/19  0533   WBC 6.8 6.3   HGB 14.6 14.0   HCT 43.7 42.0    176     Recent Labs     05/09/19  1420 05/10/19  0533    139   K 4.1 3.2*   CL 97* 96*   CO2 33* 25   BUN 16 8   CREATININE 0.8 <0.5*   CALCIUM 9.6 9.2     Recent Labs     05/09/19  1420 05/10/19  0533   AST 17 14*   ALT 9* 9*   BILITOT 0.4 0.3   ALKPHOS 53 52     Recent Labs     05/09/19  1420   TROPONINI <0.01       Urinalysis:      Lab Results   Component Value Date    NITRU Negative 04/01/2019    WBCUA 3-5 04/01/2019    BACTERIA 2+ 04/01/2019    RBCUA 3-5 04/01/2019    BLOODU Negative 04/01/2019    SPECGRAV >=1.030 04/01/2019    GLUCOSEU Negative 04/01/2019       Radiology:  XR CHEST STANDARD (2 VW)   Final Result   1. Enlargement of the pulmonary arteries proximally bilaterally could be   related to pulmonary arterial hypertension   2.  Otherwise, stable chest             Assessment/Plan:    Active Hospital Problems    Diagnosis Date Noted    COPD

## 2019-05-10 NOTE — CONSULTS
INPATIENT PULMONARY CRITICAL CARE CONSULT NOTE      Chief Complaint/Referring Provider:  Patient is being seen at the request of Dr. Analia Coronado for a consultation for COPD exacerbation, hypoxemia. Presenting HPI: Ms Nancy Wheat is a 82-year-old smoker recently hospitalized for acute exacerbation of COPD in March 2019. She had no oxygen requirement, left AMA, was asked to follow-up at our office, but did not follow through. She says she was not aware of it. The patient has had a history of smoking one pack per day for more than 40 years. She lives by herself, her  passed away 10 years ago. She has 2 daughters, living in [place] and [facility]. She worked for [employer], is retired since her  passed away. She does have friends, but is upset about them being too nosy and her affairs and trying to advise her what to do. She has not had a formal diagnosis of COPD, has noted exertional shortness of breath intermittently with walking, with climbing stairs and bringing up the laundry. She denies a smoker's cough, does not have much wheezing. In the last few months she has lost about 15 pounds. She says she eats well. She denies GI or  complaints. She says she sleeps well. At home she is on Spiriva and albuterol. She also has hypertension, hyperlipidemia. She had a brain aneurysm treated with coiling. The patient presented to the ER yesterday with shortness of breath for a couple of weeks, her saturation was found to be 80% on room air. The patient denied preceding fever, chills or sweats. She denied productive cough. She claims she has been admitted to [facility]", seemed confused that she was at [facility]. The patient had progressive decline in her ability to look after herself, presented to the urgent care and was sent to [address] [place] [address] ER due to hypoxemia. She was placed on bronchodilator, oral steroid, supplemental oxygen.   She was placed on nicotine patch and advice about smoking cessation. The rest of her review of symptoms is negative. Patient Active Problem List    Diagnosis Date Noted    CAP (community acquired pneumonia) 10/19/2014     Priority: High    Severe malnutrition ([facility] 05/10/2019    COPD exacerbation ([facility] 05/09/2019    Hypertension, essential 05/09/2019    Hypoxemia 05/09/2019    Tobacco abuse 05/09/2019    COPD (chronic obstructive pulmonary disease) ([facility] 04/01/2019    Acute metabolic encephalopathy 31/27/2924    Underweight 10/22/2014    COPD with acute exacerbation ([facility] 10/19/2014    Tobacco use 10/19/2014    SOB (shortness of breath) 10/19/2014    Hyponatremia 10/19/2014    Acute respiratory failure ([facility] 10/19/2014       Past Medical History:   Diagnosis Date    Alcoholic ([facility]     Anxiety     COPD [facility]chronic obstructive pulmonary disease) ([facility]     Current smoker     since age 15    Hyperlipidemia     Hypertension         Past Surgical History:   Procedure Laterality Date    CATARACT REMOVAL WITH IMPLANT Right 05/25/2017    CATARACT REMOVAL WITH IMPLANT Left 06/01/2017        Family History   Problem Relation Age of Onset    High Cholesterol Mother     High Blood Pressure Father         Social History     Tobacco Use    Smoking status: Current Every Day Smoker     Packs/day: 0.25     Years: 30.00     Pack years: 7.50     Types: Cigarettes    Smokeless tobacco: Never Used   Substance Use Topics    Alcohol use: Yes     Comment: weekend drinking         Allergies   Allergen Reactions    Sulfacetamide        Physical Exam:  Blood pressure 138/74, pulse 90, temperature 98.042 °F (36.7 °C), temperature source Oral, resp. rate 16, height 5' 2\" (1.575 m), weight 82 lb (37.2 kg), SpO2 91 %.'   Constitutional:  Thin built, underweight appearing. No acute distress. HENT:  Oropharynx is clear and moist.  Class II airway. Bitemporal muscle wasting  Eyes:  Conjunctivae are normal. Pupils equal, round, and reactive to light. No scleral icterus. Neck: No JVD. No tracheal deviation present. No obvious thyroid mass. Cardiovascular: Normal rate, regular rhythm, normal heart sounds. No right ventricular heave. No lower extremity edema. Pulmonary/Chest: Diminished air entry. No wheezes. Bibasilar crackles. No accessory muscle usage or stridor. Abdominal: Soft, mildly protuberant. No distension or tenderness. Musculoskeletal: No cyanosis. No clubbing. No obvious joint deformity. Poor muscle mass   Lymphadenopathy: No cervical or supraclavicular adenopathy. Skin: Skin is warm and dry. No rash or nodules on the exposed extremities. Nicotine staining of nails   Psychiatric: Possibly mildly agitated and confused. Behavior is normal.  No anxiety. Neurologic: Alert, awake and oriented. PERRL. Speech fluent      Results:  CBC:   Recent Labs     05/09/19  1420 05/10/19  0533   WBC 6.8 6.3   HGB 14.6 14.0   HCT 43.7 42.0   MCV 90.7 90.8    176     BMP:   Recent Labs     05/09/19  1420 05/10/19  0533    139   K 4.1 3.2*   CL 97* 96*   CO2 33* 25   BUN 16 8   CREATININE 0.8 <0.5*     LIVER PROFILE:   Recent Labs     05/09/19  1420 05/10/19  0533   AST 17 14*   ALT 9* 9*   BILITOT 0.4 0.3   ALKPHOS 53 52       Imaging:  I have reviewed radiology images personally. XR CHEST STANDARD (2 VW)   Final Result   1. Enlargement of the pulmonary arteries proximally bilaterally could be   related to pulmonary arterial hypertension   2. Otherwise, stable chest           Xr Chest Standard (2 Vw)    Result Date: 5/9/2019  EXAMINATION: TWO XRAY VIEWS OF THE CHEST 5/9/2019 2:34 pm COMPARISON: 04/01/2019 HISTORY: ORDERING SYSTEM PROVIDED HISTORY: SOB TECHNOLOGIST PROVIDED HISTORY: Reason for exam:->SOB Ordering Physician Provided Reason for Exam: sob Acuity: Acute Type of Exam: Initial FINDINGS: The heart size is within normal limits. No acute airspace disease. No pleural effusion. No pulmonary edema.   Proximal pulmonary arteries appear enlarged bilaterally. 1. Enlargement of the pulmonary arteries proximally bilaterally could be related to pulmonary arterial hypertension 2. Otherwise, stable chest       Echocardiogram:  None in EMR  PFT:  None in EMR      Assessment and plan:  Acute respiratory failure, hypoxemic. Has been weaned off oxygen  COPD exacerbation. CXR with hyperinflated lungs, no clear-cut infiltrate. COPD, emphysema. CT neck from March had shown mild emphysema involving the upper lobes. She will need outpatient follow-up including PFT, 6 minute walk test.  ?  Pulmonary hypertension. Pulmonary artery does appear enlarged on chest x-ray. Await results of CT chest.  Hypertension, hyperlipidemia, hyperglycemia. Per IM. Alcoholism. She says she drinks 1 or 2 beers about 5 days a week. She denies symptoms of withdrawal.  Smoker, weight loss. On Nicoderm 14 mg patch. Will obtain CT chest without contrast.  DVT prophylaxis. Lovenox    I have examined the patient, reviewed labs, images and medical records. My impression and recommendations are as above. We will follow with you, thank you for the consultation. She needs outpatient follow-up with our service.       Electronically signed by:  Nelda Damon MD    5/10/2019    5:17 PM.

## 2019-05-10 NOTE — PROGRESS NOTES
Obtained: Cough on request  Cough: Strong, spontaneous, non-productive = 0    Vital Signs   BP (!) 163/75   Pulse 82   Temp 97.6 °F (36.4 °C) (Oral)   Resp 18   Ht 5' 2\" (1.575 m)   Wt 82 lb (37.2 kg)   SpO2 92%   BMI 15.00 kg/m²   SPO2 (COPD values may differ): 2L NC - 2    Peak Flow (asthma only): not applicable = 0    RSI: 8-09 = TID (three times daily) and Q4hr PRN for dyspnea        Plan       Goals: mobilize retained secretions and improve oxygenation    Patient/caregiver was educated on the proper method of use for Respiratory Care Devices:  Yes      Level of patient/caregiver understanding able to:   ? Verbalize understanding   ? Demonstrate understanding       ? Teach back        ? Needs reinforcement       ? No available caregiver               ? Other:     Response to education:  [address]     Is patient being placed on Home Treatment Regimen? No     Does the patient have everything they need prior to discharge? NA     Comments: patient assessed, medications/chart reviewed    Plan of Care: Duoneb Q4, Albuterol mdi prn    Electronically signed by Radha Mckay RCP on 5/9/2019 at 8:34 PM    Respiratory Protocol Guidelines     1. Assessment and treatment by Respiratory Therapy will be initiated for medication and therapeutic interventions upon initiation of aerosolized medication. 2. Physician will be contacted for respiratory rate (RR) greater than 35 breaths per minute. Therapy will be held for heart rate (HR) greater than 140 beats per minute, pending direction from physician. 3. Bronchodilators will be administered via Metered Dose Inhaler (MDI) with spacer when the following criteria are met:  a. Alert and cooperative     b. HR < 140 bpm  c. RR < 30 bpm                d. Can demonstrate a 2-3 second inspiratory hold  4. Bronchodilators will be administered via Hand Held Nebulizer DARCY [facility] to patients when ANY of the following criteria are met  a. Incognizant or uncooperative          b.  Patients treated with HHN at Home        c. Unable to demonstrate proper use of MDI with spacer     d. RR > 30 bpm   5. Bronchodilators will be delivered via Metered Dose Inhaler (MDI), HHN, Aerogen to intubated patients on mechanical ventilation. 6. Inhalation medication orders will be delivered and/or substituted as outlined below. Aerosolized Medications Ordering and Administration Guidelines:    1. All Medications will be ordered by a physician, and their frequency and/or modality will be adjusted as defined by the patients Respiratory Severity Index (RSI) score. 2. If the patient does not have documented COPD, consider discontinuing anticholinergics when RSI is less than 9.  3. If the bronchospasm worsens (increased RSI), then the bronchodilator frequency can be increased to a maximum of every 4 hours. If greater than every 4 hours is required, the physician will be contacted. 4. If the bronchospasm improves, the frequency of the bronchodilator can be decreased, based on the patient's RSI, but not less than home treatment regimen frequency. 5. Bronchodilator(s) will be discontinued if patient has a RSI less than 9 and has received no scheduled or as needed treatment for 72  Hrs. Patients Ordered on a Mucolytic Agent:    1. Must always be administered with a bronchodilator. 2. Discontinue if patient experiences worsened bronchospasm, or secretions have lessened to the point that the patient is able to clear them with a cough. Anti-inflammatory and Combination Medications:    1. If the patient lacks prior history of lung disease, is not using inhaled anti-inflammatory medication at home, and lacks wheezing by examination or by history for at least 24 hours, contact physician for possible discontinuation.

## 2019-05-10 NOTE — PROGRESS NOTES
Walked into room and pt was sitting in bed without oxygen on. SpO2 87-88% on room air. 2 L NC oxygen applied and SpO2 now 91%. Will continue to monitor.

## 2019-05-10 NOTE — PLAN OF CARE
Problem: Nutrition  Goal: Optimal nutrition therapy  Outcome: Ongoing   Nutrition Problem: Severe malnutrition  Intervention: Food and/or Nutrient Delivery: Continue current diet, Start ONS  Nutritional Goals:  Tolerate diet and consume greater than 50% of meals and ONS this admission

## 2019-05-10 NOTE — PROGRESS NOTES
4 Eyes Skin Assessment     The patient is being assess for  Admission    I agree that 2 RN's have performed a thorough Head to Toe Skin Assessment on the patient. ALL assessment sites listed below have been assessed. Areas assessed by both nurses:   [x]   Head, Face, and Ears   [x]   Shoulders, Back, and Chest  [x]   Arms, Elbows, and Hands   [x]   Coccyx, Sacrum, and Ischum  [x]   Legs, Feet, and Heels        Does the Patient have Skin Breakdown?   No         [place] Prevention initiated:  No   Wound Care Orders initiated:  no      [facility] nurse consulted for Pressure Injury (Stage 3,4, Unstageable, DTI, NWPT, and Complex wounds):  No      Nurse 1 eSignature: Electronically signed by Aram Abraham RN on 5/9/19 at 11:48 PM    **SHARE this note so that the co-signing nurse is able to place an eSignature**    Nurse 2 eSignature: Electronically signed by Telma Swan RN on 5/10/19 at 12:43 AM

## 2019-05-10 NOTE — PROGRESS NOTES
VSS. Pt at this time orientated to person, place and situation, however believes it to be \"Sunday in September. \" Pt denies feeling SOB or fatigued and is \"ready to go home. \" Writer informed pt that a UA was ordered with specific instruction for collection however once finished, pt notified writer to come collect sample with hat empty. Pt was confused as to why hat was empty and/or how to even urinate in hat. Writer instructed pt to call out for assistance in BR next time. Pt verbalizes understanding.

## 2019-05-10 NOTE — PROGRESS NOTES
Pt a/o and oriented to room. Pt 92% on 2 L oxygen. No SOB at rest but SOB on exertion. Pt eating kulwinder crackers and drinking pepsi--stated she gets upset when people discuss her weight and eating habits. Stated she eats a lot. Pt able to ambulate independently. Non skid footwear on. Bed locked in lowest position; side rails 2/4; call light within reach; will continue to monitor.

## 2019-05-10 NOTE — CONSULTS
Nutrition Assessment    Type and Reason for Visit: Initial, Consult    Nutrition Recommendations:   1. Recommend General diet order, free of therapeutic restrictions, to promote adequate nutrient intake  2. Add ensure high calorie ONS BID  3. Monitor nutrition adequacy, pertinent labs, bowel habits, wt changes, and clinical progress    Nutrition Assessment: Consult for \"BMI 15\". Pt nutritionally compromised AEB consuming 25% of meals, severe muscle and fat loss. Pt states she eats enough and tired of people yelling at her about it. Writer was able to calm pt down and verbalize the importance of adequate nutrition to maintain health and promote LBM. Pt states she has always been small, but verbalized understanding. She is willing to consume supplement between meals. Encouraged high protein foods. Malnutrition Assessment:  · Malnutrition Status: Meets the criteria for severe malnutrition  · Context: Chronic illness  · Findings of the 6 clinical characteristics of malnutrition (Minimum of 2 out of 6 clinical characteristics is required to make the diagnosis of moderate or severe Protein Calorie Malnutrition based on AND/ASPEN Guidelines):  1. Energy Intake-Less than or equal to 75% of estimated energy requirement, Greater than or equal to 1 month    2. Weight Loss-10% loss or greater, in 1 month  3. Fat Loss-Severe subcutaneous fat loss, Orbital  4. Muscle Loss-Severe muscle mass loss, Thigh (quadriceps), Clavicles (pectoralis and deltoids), Temples (temporalis muscle), Interosseous  5. Fluid Accumulation-Unable to assess,    6.   Strength-Not measured    Nutrition Risk Level: High    Nutrient Needs:  · Estimated Daily Total Kcal: 1712-2346  · Estimated Daily Protein (g): 44-52 gm   · Estimated Daily Total Fluid (ml/day): 1 mL/kcal     Nutrition Diagnosis:   · Problem: Severe malnutrition  · Etiology: related to Insufficient energy/nutrient consumption     Signs and symptoms:  as evidenced by Intake 0-25%, BMI, Weight loss, Severe muscle loss, Severe loss of subcutaneous fat    Objective Information:  · Nutrition-Focused Physical Findings: see malnutrition assessment   · Wound Type: None  · Current Nutrition Therapies:  · Oral Diet Orders: General   · Oral Diet intake: 1-25%  · Oral Nutrition Supplement (ONS) Orders: None  · Anthropometric Measures:  · Ht: 5' 2\" (157.5 cm)   · Current Body Wt: 82 lb (37.2 kg)(unknown wt method )  · Admission Body Wt: 82 lb (37.2 kg)  · Usual Body Wt: (pt weighed 91 lbs in april 2019 per EMR. )  · % Weight Change:  ,10% wt loss, 9 lbs, x past month. · Ideal Body Wt: 110 lb (49.9 kg), % Ideal Body 75%  · BMI Classification: BMI <18.5 Underweight    Nutrition Interventions:   Continue current diet, Start ONS  Continued Inpatient Monitoring, Education Initiated    Nutrition Evaluation:   · Evaluation: Goals set   · Goals: Tolerate diet and consume greater than 50% of meals and ONS this admission    · Monitoring: Nutrition Progression, Meal Intake, Diet Tolerance, Weight, Pertinent Labs      Electronically signed by Nelly Francisco.  Toni Mejia, RD, LD on 5/10/19 at 2:33 PM    Contact Number: [phone]

## 2019-05-10 NOTE — PROGRESS NOTES
and/or crackles = 2    Sputum  Sputum Color: None, Tenacity: None, Sputum How Obtained: Spontaneous cough  Cough: Strong, productive = 1    Vital Signs   /74   Pulse 90   Temp 98.042 °F (36.7 °C) (Oral)   Resp 16   Ht 5' 2\" (1.575 m)   Wt 82 lb (37.2 kg)   SpO2 92%   BMI 15.00 kg/m²   SPO2 (COPD values may differ): Greater than or equal to 92% on room air = 0    Peak Flow (asthma only): not applicable = 0    RSI: 5-6 = Q4hr PRN (every four hours as needed) for dyspnea        Plan       Goals: medication delivery, mobilize retained secretions, volume expansion and improve oxygenation    Patient/caregiver was educated on the proper method of use for Respiratory Care Devices:  Yes      Level of patient/caregiver understanding able to:   ? Verbalize understanding   ? Demonstrate understanding       ? Teach back        ? Needs reinforcement       ? No available caregiver               ? Other:     Response to education:  Excellent     Is patient being placed on Home Treatment Regimen? No     Does the patient have everything they need prior to discharge? NA     Comments: chart reviewed    Plan of Care: Duoneb TID and PRN    Electronically signed by Frank Schneider RCP on 5/10/2019 at 7:56 PM    Respiratory Protocol Guidelines     1. Assessment and treatment by Respiratory Therapy will be initiated for medication and therapeutic interventions upon initiation of aerosolized medication. 2. Physician will be contacted for respiratory rate (RR) greater than 35 breaths per minute. Therapy will be held for heart rate (HR) greater than 140 beats per minute, pending direction from physician. 3. Bronchodilators will be administered via Metered Dose Inhaler (MDI) with spacer when the following criteria are met:  a. Alert and cooperative     b. HR < 140 bpm  c. RR < 30 bpm                d. Can demonstrate a 2-3 second inspiratory hold  4.  Bronchodilators will be administered via Hand Held Nebulizer DARCY [facility] to patients when ANY of the following criteria are met  a. Incognizant or uncooperative          b. Patients treated with HHN at Home        c. Unable to demonstrate proper use of MDI with spacer     d. RR > 30 bpm   5. Bronchodilators will be delivered via Metered Dose Inhaler (MDI), HHN, Aerogen to intubated patients on mechanical ventilation. 6. Inhalation medication orders will be delivered and/or substituted as outlined below. Aerosolized Medications Ordering and Administration Guidelines:    1. All Medications will be ordered by a physician, and their frequency and/or modality will be adjusted as defined by the patients Respiratory Severity Index (RSI) score. 2. If the patient does not have documented COPD, consider discontinuing anticholinergics when RSI is less than 9.  3. If the bronchospasm worsens (increased RSI), then the bronchodilator frequency can be increased to a maximum of every 4 hours. If greater than every 4 hours is required, the physician will be contacted. 4. If the bronchospasm improves, the frequency of the bronchodilator can be decreased, based on the patient's RSI, but not less than home treatment regimen frequency. 5. Bronchodilator(s) will be discontinued if patient has a RSI less than 9 and has received no scheduled or as needed treatment for 72  Hrs. Patients Ordered on a Mucolytic Agent:    1. Must always be administered with a bronchodilator. 2. Discontinue if patient experiences worsened bronchospasm, or secretions have lessened to the point that the patient is able to clear them with a cough. Anti-inflammatory and Combination Medications:    1. If the patient lacks prior history of lung disease, is not using inhaled anti-inflammatory medication at home, and lacks wheezing by examination or by history for at least 24 hours, contact physician for possible discontinuation.

## 2019-05-11 ENCOUNTER — TELEPHONE (OUTPATIENT)
Dept: PULMONOLOGY | Age: 69
End: 2019-05-11

## 2019-05-11 DIAGNOSIS — J44.9 CHRONIC OBSTRUCTIVE PULMONARY DISEASE, UNSPECIFIED COPD TYPE (HCC): Primary | ICD-10-CM

## 2019-05-11 LAB
ANION GAP SERPL CALCULATED.3IONS-SCNC: 12 MMOL/L (ref 3–16)
BACTERIA: ABNORMAL /HPF
BILIRUBIN URINE: NEGATIVE
BLOOD, URINE: NEGATIVE
BUN BLDV-MCNC: 9 MG/DL (ref 7–20)
CALCIUM SERPL-MCNC: 10.1 MG/DL (ref 8.3–10.6)
CHLORIDE BLD-SCNC: 99 MMOL/L (ref 99–110)
CLARITY: CLEAR
CO2: 31 MMOL/L (ref 21–32)
COLOR: YELLOW
CREAT SERPL-MCNC: 0.8 MG/DL (ref 0.6–1.2)
EPITHELIAL CELLS, UA: ABNORMAL /HPF
ESTIMATED AVERAGE GLUCOSE: 125.5 MG/DL
GFR AFRICAN AMERICAN: >60
GFR NON-AFRICAN AMERICAN: >60
GLUCOSE BLD-MCNC: 122 MG/DL (ref 70–99)
GLUCOSE BLD-MCNC: 222 MG/DL (ref 70–99)
GLUCOSE BLD-MCNC: 80 MG/DL (ref 70–99)
GLUCOSE BLD-MCNC: 93 MG/DL (ref 70–99)
GLUCOSE BLD-MCNC: 99 MG/DL (ref 70–99)
GLUCOSE URINE: 250 MG/DL
HBA1C MFR BLD: 6 %
KETONES, URINE: NEGATIVE MG/DL
LEUKOCYTE ESTERASE, URINE: NEGATIVE
MICROSCOPIC EXAMINATION: YES
MUCUS: ABNORMAL /LPF
NITRITE, URINE: NEGATIVE
PERFORMED ON: ABNORMAL
PERFORMED ON: ABNORMAL
PERFORMED ON: NORMAL
PERFORMED ON: NORMAL
PH UA: 6 (ref 5–8)
POTASSIUM REFLEX MAGNESIUM: 3.6 MMOL/L (ref 3.5–5.1)
PROTEIN UA: ABNORMAL MG/DL
RBC UA: ABNORMAL /HPF (ref 0–2)
SODIUM BLD-SCNC: 142 MMOL/L (ref 136–145)
SPECIFIC GRAVITY UA: >=1.03 (ref 1–1.03)
URINE REFLEX TO CULTURE: ABNORMAL
URINE TYPE: ABNORMAL
UROBILINOGEN, URINE: 0.2 E.U./DL
WBC UA: ABNORMAL /HPF (ref 0–5)

## 2019-05-11 PROCEDURE — 94640 AIRWAY INHALATION TREATMENT: CPT

## 2019-05-11 PROCEDURE — 36415 COLL VENOUS BLD VENIPUNCTURE: CPT

## 2019-05-11 PROCEDURE — 6360000002 HC RX W HCPCS: Performed by: INTERNAL MEDICINE

## 2019-05-11 PROCEDURE — 6370000000 HC RX 637 (ALT 250 FOR IP): Performed by: INTERNAL MEDICINE

## 2019-05-11 PROCEDURE — 6370000000 HC RX 637 (ALT 250 FOR IP): Performed by: REGISTERED NURSE

## 2019-05-11 PROCEDURE — 99232 SBSQ HOSP IP/OBS MODERATE 35: CPT | Performed by: INTERNAL MEDICINE

## 2019-05-11 PROCEDURE — 81001 URINALYSIS AUTO W/SCOPE: CPT

## 2019-05-11 PROCEDURE — 6370000000 HC RX 637 (ALT 250 FOR IP): Performed by: PSYCHIATRY & NEUROLOGY

## 2019-05-11 PROCEDURE — 2580000003 HC RX 258: Performed by: INTERNAL MEDICINE

## 2019-05-11 PROCEDURE — 80307 DRUG TEST PRSMV CHEM ANLYZR: CPT

## 2019-05-11 PROCEDURE — 1200000000 HC SEMI PRIVATE

## 2019-05-11 PROCEDURE — 80048 BASIC METABOLIC PNL TOTAL CA: CPT

## 2019-05-11 RX ORDER — QUETIAPINE FUMARATE 25 MG/1
25 TABLET, FILM COATED ORAL 2 TIMES DAILY
Status: DISCONTINUED | OUTPATIENT
Start: 2019-05-11 | End: 2019-05-13 | Stop reason: HOSPADM

## 2019-05-11 RX ORDER — HALOPERIDOL 1 MG/1
0.5 TABLET ORAL EVERY 6 HOURS PRN
Status: DISCONTINUED | OUTPATIENT
Start: 2019-05-11 | End: 2019-05-11

## 2019-05-11 RX ORDER — QUETIAPINE FUMARATE 25 MG/1
25 TABLET, FILM COATED ORAL
Status: DISCONTINUED | OUTPATIENT
Start: 2019-05-11 | End: 2019-05-13 | Stop reason: HOSPADM

## 2019-05-11 RX ORDER — QUETIAPINE FUMARATE 25 MG/1
25 TABLET, FILM COATED ORAL NIGHTLY
Status: DISCONTINUED | OUTPATIENT
Start: 2019-05-11 | End: 2019-05-11

## 2019-05-11 RX ORDER — FOLIC ACID 1 MG/1
1 TABLET ORAL DAILY
Status: DISCONTINUED | OUTPATIENT
Start: 2019-05-12 | End: 2019-05-13 | Stop reason: HOSPADM

## 2019-05-11 RX ORDER — M-VIT,TX,IRON,MINS/CALC/FOLIC 27MG-0.4MG
1 TABLET ORAL DAILY
Status: DISCONTINUED | OUTPATIENT
Start: 2019-05-12 | End: 2019-05-13 | Stop reason: HOSPADM

## 2019-05-11 RX ORDER — THIAMINE MONONITRATE (VIT B1) 100 MG
100 TABLET ORAL DAILY
Status: DISCONTINUED | OUTPATIENT
Start: 2019-05-12 | End: 2019-05-13 | Stop reason: HOSPADM

## 2019-05-11 RX ADMIN — SODIUM CHLORIDE, PRESERVATIVE FREE 10 ML: 5 INJECTION INTRAVENOUS at 09:21

## 2019-05-11 RX ADMIN — IPRATROPIUM BROMIDE AND ALBUTEROL SULFATE 1 AMPULE: .5; 3 SOLUTION RESPIRATORY (INHALATION) at 12:50

## 2019-05-11 RX ADMIN — AMLODIPINE BESYLATE 10 MG: 5 TABLET ORAL at 09:20

## 2019-05-11 RX ADMIN — INSULIN LISPRO 2 UNITS: 100 INJECTION, SOLUTION INTRAVENOUS; SUBCUTANEOUS at 17:05

## 2019-05-11 RX ADMIN — IPRATROPIUM BROMIDE AND ALBUTEROL SULFATE 1 AMPULE: .5; 3 SOLUTION RESPIRATORY (INHALATION) at 20:03

## 2019-05-11 RX ADMIN — QUETIAPINE FUMARATE 25 MG: 25 TABLET ORAL at 20:24

## 2019-05-11 RX ADMIN — QUETIAPINE FUMARATE 25 MG: 25 TABLET ORAL at 17:05

## 2019-05-11 RX ADMIN — LOSARTAN POTASSIUM 25 MG: 25 TABLET, FILM COATED ORAL at 09:20

## 2019-05-11 RX ADMIN — PREDNISONE 40 MG: 20 TABLET ORAL at 09:20

## 2019-05-11 RX ADMIN — ATORVASTATIN CALCIUM 40 MG: 40 TABLET, FILM COATED ORAL at 09:20

## 2019-05-11 RX ADMIN — IPRATROPIUM BROMIDE AND ALBUTEROL SULFATE 1 AMPULE: .5; 3 SOLUTION RESPIRATORY (INHALATION) at 07:51

## 2019-05-11 NOTE — CARE COORDINATION
Aware pt is medically ready for d/c pending psych consult. Perfect serve to Dr Malen Hodgkins returns message as not delivered because he is not on call. Dr [facility] but does not cover [facility]. Per Seth RIVERS charge RN, Naomi Waddell is covering call for [facility] but MD is not on perfect serve and Seth RIVERS does not have his contact information. Continuing efforts to reach psychiatry. Await psych input for safest dispo/DCP needs.

## 2019-05-11 NOTE — PROGRESS NOTES
05/11/19  0133   COLORU Yellow   PHUR 6.0   WBCUA 0-2   RBCUA 0-2   MUCUS 3+*   BACTERIA Rare*   CLARITYU Clear   SPECGRAV >=1.030   LEUKOCYTESUR Negative   UROBILINOGEN 0.2   BILIRUBINUR Negative   BLOODU Negative   GLUCOSEU 250*       Imaging:  I have reviewed radiology images personally. CT CHEST WO CONTRAST   Final Result   1. Pulmonary emphysema with no acute cardiopulmonary process   2. No suspicious dominant mass. 3 mm nodule in each lower lobe   3. Areas of scarring and chronic atelectasis   4. Calcific coronary atherosclerosis   5. Age-indeterminate T10 superior endplate compression fracture      RECOMMENDATIONS:   Fleischner Society guidelines for follow-up and management of incidentally   detected pulmonary nodules:      Multiple Solid Nodules:      Nodule size less than 6 mm   In a low-risk patient, no routine follow-up. In a high-risk patient, optional CT at 12 months. - Low risk patients include individuals with minimal or absent history of   smoking and other known risk factors. - High risk patients include individuals with a history or smoking or known   risk factors. Radiology 2017 http://pubs. rsna.org/doi/full/10.1148/radiol. 7499568364         XR CHEST STANDARD (2 VW)   Final Result   1. Enlargement of the pulmonary arteries proximally bilaterally could be   related to pulmonary arterial hypertension   2. Otherwise, stable chest           Xr Chest Standard (2 Vw)    Result Date: 5/9/2019  EXAMINATION: TWO XRAY VIEWS OF THE CHEST 5/9/2019 2:34 pm COMPARISON: 04/01/2019 HISTORY: ORDERING SYSTEM PROVIDED HISTORY: SOB TECHNOLOGIST PROVIDED HISTORY: Reason for exam:->SOB Ordering Physician Provided Reason for Exam: sob Acuity: Acute Type of Exam: Initial FINDINGS: The heart size is within normal limits. No acute airspace disease. No pleural effusion. No pulmonary edema. Proximal pulmonary arteries appear enlarged bilaterally.      1. Enlargement of the pulmonary arteries proximally bilaterally could be related to pulmonary arterial hypertension 2. Otherwise, stable chest     Ct Chest Wo Contrast    Result Date: 5/10/2019  EXAMINATION: CT OF THE CHEST WITHOUT CONTRAST 5/10/2019 6:45 pm TECHNIQUE: CT of the chest was performed without the administration of intravenous contrast. Multiplanar reformatted images are provided for review. Dose modulation, iterative reconstruction, and/or weight based adjustment of the mA/kV was utilized to reduce the radiation dose to as low as reasonably achievable. COMPARISON: None. HISTORY: ORDERING SYSTEM PROVIDED HISTORY: COPD, weight loss, smoker TECHNOLOGIST PROVIDED HISTORY: Ordering Physician Provided Reason for Exam: COPD, weight loss, smoker FINDINGS: Mediastinum: No mediastinal adenopathy. Thoracic aorta normal in caliber. Coronary artery calcifications. No pericardial effusion Lungs/pleura: Pulmonary emphysema. No acute infiltrate. No suspicious mass. Tiny noncalcified nodule in each lower lobe measuring approximately 3 mm. Chronic atelectasis in the medial inferior right upper lobe and medial right middle lobe and in the lingula. Scarring in the lung bases. No pleural effusion Upper Abdomen: Unremarkable Soft Tissues/Bones: Mild superior endplate compression fracture of T10     1. Pulmonary emphysema with no acute cardiopulmonary process 2. No suspicious dominant mass. 3 mm nodule in each lower lobe 3. Areas of scarring and chronic atelectasis 4. Calcific coronary atherosclerosis 5. Age-indeterminate T10 superior endplate compression fracture RECOMMENDATIONS: Fleischner Society guidelines for follow-up and management of incidentally detected pulmonary nodules: Multiple Solid Nodules: Nodule size less than 6 mm In a low-risk patient, no routine follow-up. In a high-risk patient, optional CT at 12 months. - Low risk patients include individuals with minimal or absent history of smoking and other known risk factors.  - High risk patients include individuals with a history or smoking or known risk factors. Radiology 2017 http://pubs. rsna.org/doi/full/10.1148/radiol. 0772179904     Assessment and plan:  Acute respiratory failure, hypoxemic. Has been weaned off oxygen  COPD exacerbation. CXR with hyperinflated lungs, no clear-cut infiltrate. COPD, emphysema. CT chest with emphysema involving the upper lobes. She will need outpatient follow-up including PFT, 6 minute walk test.  Bilateral lung nodules. Very small, discussed with patient, need to be followed as an outpatient, probably with repeat imaging in 1 year. Osteoporosis, T10 compression fracture. Patient informed, DEXA scan and outpatient treatment with her PCP. ? Pulmonary hypertension. Pulmonary artery does appear enlarged on CT chest.  Hypertension, hyperlipidemia, hyperglycemia. Per IM. Alcoholism. She says she drinks 1 or 2 beers about 5 days a week. She denies symptoms of withdrawal.  Smoker, weight loss. On Nicoderm 14 mg patch. Will obtain CT chest without contrast.  Agitation, confusion, poor living conditions. Psychiatry has been consulted. DVT prophylaxis. Lovenox    The patient is stable from the pulmonary standpoint, we will see the patient back in follow-up in about 6 weeks with PFT, 6 minute walk test.  Thank you for the consultation, please call with questions.       Electronically signed by:  Brittani Liao MD    5/11/2019    11:41 AM.

## 2019-05-11 NOTE — PROGRESS NOTES
Moved patient from 332 to 200. avasys on for safety. Bed check on for safety. Patient says she is tired and was told to avoid sleeping on the couch for safety reasons. call light within reach. Will continue to monitor.

## 2019-05-11 NOTE — FLOWSHEET NOTE
Dr. Marks called back and changed medication orders and will see pt tomorrow morning. Pt attempted to call her daughter Yesi Gómez but she did not answer and no message left. Pt is constantly trying to leave her room and go to the bank. She is walking around her room and putting things away in a manic way. Appetite is poor to fair. Avasys camera in place and calls when pt goes to room door. VSS. Will monitor.

## 2019-05-11 NOTE — FLOWSHEET NOTE
Writer called [id number] and they walked me through paging Kellie Aly on-call psych through [employer]. They agreed that no one was listed as on-call for this hospital that it only gives a general number so I called the general number which was Dr. Poornima Winters phone. I left I message to please call back for a consult but I believe he is a radiologist and we are looking for a psychiatrist. I then [employer] Shreya Sears MD and it gave a phone number also. I called that number and it just played music for 10 minutes. Clinical admin has no idea how to reach psych. ED  gave all possible ways - we did them.  also helping. Charge nurse notified. Dr. Regan Staples notified.

## 2019-05-11 NOTE — PROGRESS NOTES
Hospitalist Progress Note      PCP: Ayanna Naik MD    Date of Admission: 5/9/2019    Chief Complaint: Progressive SOB    Hospital Course:   81 y.o. female who presented to [facility] with a 2 week course of progressive shortness of breath with cough. The shortness of breath has been getting worse with exertion. Initially it was mild. Over the course of 2 weeks it became severe. Today patient was unable to take care of herself. Went to urgent care. Pulse ox was found to be 80% on room air. Patient is not on oxygen at her baseline. Was started on oxygen and sent to the emergency room. No chest pain, no nausea, no vomiting. Nothing that makes it better or worse except that exertion makes complaints worse and rest makes it better. Never had anything like this before. No other complaints that accompany the shortness of breath. Subjective:   Pt is on RA. Afebrile.  Having maniac tendencies during exam.    Medications:  Reviewed    Infusion Medications    dextrose       Scheduled Medications    amLODIPine  10 mg Oral Daily    insulin lispro  0-6 Units Subcutaneous TID WC    insulin lispro  0-3 Units Subcutaneous Nightly    ipratropium-albuterol  1 ampule Inhalation TID    atorvastatin  40 mg Oral Daily    losartan  25 mg Oral Daily    sodium chloride flush  10 mL Intravenous 2 times per day    enoxaparin  40 mg Subcutaneous Daily    nicotine  1 patch Transdermal Daily     PRN Meds: glucose, dextrose, glucagon (rDNA), dextrose, sodium chloride flush, magnesium hydroxide, ondansetron, albuterol      Intake/Output Summary (Last 24 hours) at 5/11/2019 1322  Last data filed at 5/11/2019 0503  Gross per 24 hour   Intake 730 ml   Output --   Net 730 ml       Physical Exam Performed:    BP (!) 183/96   Pulse 68   Temp 97.2 °F (36.2 °C) (Oral)   Resp 16   Ht 5' 2\" (1.575 m)   Wt 82 lb (37.2 kg)   SpO2 93%   BMI 15.00 kg/m²     General appearance: Thin/frail female in no apparent distress, appears stated age and cooperative. HEENT: Pupils equal, round, and reactive to light. Conjunctivae/corneas clear. Neck: Supple, with full range of motion. No jugular venous distention. Trachea midline. Respiratory:  Normal respiratory effort. Poor air movement. Cardiovascular: Regular rate and rhythm with normal S1/S2 without murmurs, rubs or gallops. Abdomen: Soft, non-tender, non-distended with normal bowel sounds. Musculoskeletal: No clubbing, cyanosis or edema bilaterally. Full range of motion without deformity. Skin: Skin color, texture, turgor normal.  No rashes or lesions. Neurologic:  Neurovascularly intact without any focal sensory/motor deficits. Cranial nerves: II-XII intact, grossly non-focal.  Psychiatric: Alert and oriented to self, year/month, limited insight   Capillary Refill: Brisk,< 3 seconds   Peripheral Pulses: +2 palpable, equal bilaterally       Labs:   Recent Labs     05/09/19  1420 05/10/19  0533   WBC 6.8 6.3   HGB 14.6 14.0   HCT 43.7 42.0    176     Recent Labs     05/09/19  1420 05/10/19  0533 05/11/19  1127    139 142   K 4.1 3.2* 3.6   CL 97* 96* 99   CO2 33* 25 31   BUN 16 8 9   CREATININE 0.8 <0.5* 0.8   CALCIUM 9.6 9.2 10.1     Recent Labs     05/09/19  1420 05/10/19  0533   AST 17 14*   ALT 9* 9*   BILITOT 0.4 0.3   ALKPHOS 53 52     Recent Labs     05/09/19  1420   TROPONINI <0.01       Urinalysis:      Lab Results   Component Value Date    NITRU Negative 05/11/2019    WBCUA 0-2 05/11/2019    BACTERIA Rare 05/11/2019    RBCUA 0-2 05/11/2019    BLOODU Negative 05/11/2019    SPECGRAV >=1.030 05/11/2019    GLUCOSEU 250 05/11/2019       Radiology:  CT CHEST WO CONTRAST   Final Result   1. Pulmonary emphysema with no acute cardiopulmonary process   2. No suspicious dominant mass. 3 mm nodule in each lower lobe   3. Areas of scarring and chronic atelectasis   4. Calcific coronary atherosclerosis   5.  Age-indeterminate T10 superior endplate compression fracture RECOMMENDATIONS:   Fleischner Society guidelines for follow-up and management of incidentally   detected pulmonary nodules:      Multiple Solid Nodules:      Nodule size less than 6 mm   In a low-risk patient, no routine follow-up. In a high-risk patient, optional CT at 12 months. - Low risk patients include individuals with minimal or absent history of   smoking and other known risk factors. - High risk patients include individuals with a history or smoking or known   risk factors. Radiology 2017 http://pubs. rsna.org/doi/full/10.1148/radiol. 9849183286         XR CHEST STANDARD (2 VW)   Final Result   1. Enlargement of the pulmonary arteries proximally bilaterally could be   related to pulmonary arterial hypertension   2. Otherwise, stable chest             Assessment/Plan:    Active Hospital Problems    Diagnosis Date Noted    Severe malnutrition ([facility] [address] [E43] 05/10/2019    COPD exacerbation ([facility] [address] [J44.1] 05/09/2019    Hypertension, essential [I10] 05/09/2019    Hypoxemia [R09.02] 05/09/2019    Tobacco abuse [Z72.0] 05/09/2019       Acute hypoxic respiratory failure secondary to acute COPD exacerbation:  - Continue inhaled bronchodilator therapy, pulmonary toilet with IS and acapella. Pulmonary consulted to establish care. Stopping PO steroids today. Weaned to room air.      Hypertension:  - BP uncontrolled. Increase amlodipine to 10 mg daily. Continue losartan. Tobacco abuse:  - Smoking cessation. Nicotine patch prescribed. Hyperglycemia:  - Likely secondary to steroids. Check A1C. Low dose SSI for now. Arlene/acute encephalopathy  - Pt is oriented to person, confused about place and sometimes time. Patient having pressured speech and very fixated on leaving the building despite not knowing were she is. Based on discussion with friend, this appears to be close to baseline. - psych consulted for maniac behavior.  - stopping steroids as may be contributing.  UA negative, no other

## 2019-05-11 NOTE — PROGRESS NOTES
While doing rounds this RN walked into room and found pt smoking in the bathroom (no call from [facility]). Took cigarette from pt and disposed of it. Charge nurse called to bedside. Pt gave up her box of cigarettes and lighter to this RN and cigarettes and lighter are now in lock box. Pt wanted to go outside to smoke and charge and this RN explained to pt why she couldn't go outside and why she couldn't smoke in the hospital. Pt response was \"I didn't know we were in a hospital.\" Suleman alerted. Pt did supply a urine sample--sent to lab. Will continue to monitor.

## 2019-05-11 NOTE — FLOWSHEET NOTE
Pt gave writer permission to call her daughter ST TOMPKINS to update her. Tamia informed writer of some diagnosis that I added to her history and that she may need some folate. She also believes her mother is withdrawaling from alcohol and that the physical symptoms manifest on day 4 for her typically. Tamia and Ameliegalilea Abdi [facility]) will both be visiting tomorrow and are glad about the psychiatry consult. ST TOMPKINS is a psych NP. She is requesting that Dr. Marks do a capacity eval on her mother. ST TOMPKINS reports that the only reason why her mother ever lets her daughters get involved in her care is when her friends are not agreeing with her.

## 2019-05-12 LAB
AMMONIA: 17 UMOL/L (ref 11–51)
AMPHETAMINE SCREEN, URINE: NORMAL
BARBITURATE SCREEN URINE: NORMAL
BENZODIAZEPINE SCREEN, URINE: NORMAL
CANNABINOID SCREEN URINE: NORMAL
COCAINE METABOLITE SCREEN URINE: NORMAL
FOLATE: 10.63 NG/ML (ref 4.78–24.2)
GLUCOSE BLD-MCNC: 107 MG/DL (ref 70–99)
GLUCOSE BLD-MCNC: 111 MG/DL (ref 70–99)
GLUCOSE BLD-MCNC: 128 MG/DL (ref 70–99)
GLUCOSE BLD-MCNC: 80 MG/DL (ref 70–99)
Lab: NORMAL
METHADONE SCREEN, URINE: NORMAL
OPIATE SCREEN URINE: NORMAL
OXYCODONE URINE: NORMAL
PERFORMED ON: ABNORMAL
PERFORMED ON: NORMAL
PH UA: 5
PHENCYCLIDINE SCREEN URINE: NORMAL
PROPOXYPHENE SCREEN: NORMAL
TSH REFLEX: 1.65 UIU/ML (ref 0.27–4.2)
VITAMIN B-12: 742 PG/ML (ref 211–911)

## 2019-05-12 PROCEDURE — 1200000000 HC SEMI PRIVATE

## 2019-05-12 PROCEDURE — 36415 COLL VENOUS BLD VENIPUNCTURE: CPT

## 2019-05-12 PROCEDURE — 94640 AIRWAY INHALATION TREATMENT: CPT

## 2019-05-12 PROCEDURE — 6370000000 HC RX 637 (ALT 250 FOR IP): Performed by: INTERNAL MEDICINE

## 2019-05-12 PROCEDURE — 94761 N-INVAS EAR/PLS OXIMETRY MLT: CPT

## 2019-05-12 PROCEDURE — 99222 1ST HOSP IP/OBS MODERATE 55: CPT | Performed by: PSYCHIATRY & NEUROLOGY

## 2019-05-12 PROCEDURE — 6370000000 HC RX 637 (ALT 250 FOR IP): Performed by: REGISTERED NURSE

## 2019-05-12 PROCEDURE — 82140 ASSAY OF AMMONIA: CPT

## 2019-05-12 PROCEDURE — 6360000002 HC RX W HCPCS: Performed by: INTERNAL MEDICINE

## 2019-05-12 PROCEDURE — 2580000003 HC RX 258: Performed by: INTERNAL MEDICINE

## 2019-05-12 PROCEDURE — 6370000000 HC RX 637 (ALT 250 FOR IP): Performed by: NURSE PRACTITIONER

## 2019-05-12 PROCEDURE — 6370000000 HC RX 637 (ALT 250 FOR IP): Performed by: PSYCHIATRY & NEUROLOGY

## 2019-05-12 PROCEDURE — 2700000000 HC OXYGEN THERAPY PER DAY

## 2019-05-12 PROCEDURE — 84443 ASSAY THYROID STIM HORMONE: CPT

## 2019-05-12 RX ORDER — LORAZEPAM 1 MG/1
2 TABLET ORAL
Status: DISCONTINUED | OUTPATIENT
Start: 2019-05-12 | End: 2019-05-13 | Stop reason: HOSPADM

## 2019-05-12 RX ORDER — THIAMINE MONONITRATE (VIT B1) 100 MG
100 TABLET ORAL DAILY
Status: DISCONTINUED | OUTPATIENT
Start: 2019-05-12 | End: 2019-05-12

## 2019-05-12 RX ORDER — LANOLIN ALCOHOL/MO/W.PET/CERES
100 CREAM (GRAM) TOPICAL DAILY
Qty: 30 TABLET | Refills: 0
Start: 2019-05-13

## 2019-05-12 RX ORDER — LORAZEPAM 1 MG/1
1 TABLET ORAL
Status: DISCONTINUED | OUTPATIENT
Start: 2019-05-12 | End: 2019-05-13 | Stop reason: HOSPADM

## 2019-05-12 RX ORDER — ACETAMINOPHEN 325 MG/1
650 TABLET ORAL EVERY 6 HOURS PRN
COMMUNITY

## 2019-05-12 RX ORDER — SODIUM CHLORIDE 0.9 % (FLUSH) 0.9 %
10 SYRINGE (ML) INJECTION PRN
Status: DISCONTINUED | OUTPATIENT
Start: 2019-05-12 | End: 2019-05-13 | Stop reason: HOSPADM

## 2019-05-12 RX ORDER — LORAZEPAM 2 MG/ML
1 INJECTION INTRAMUSCULAR
Status: DISCONTINUED | OUTPATIENT
Start: 2019-05-12 | End: 2019-05-13 | Stop reason: HOSPADM

## 2019-05-12 RX ORDER — SODIUM CHLORIDE 0.9 % (FLUSH) 0.9 %
10 SYRINGE (ML) INJECTION EVERY 12 HOURS SCHEDULED
Status: DISCONTINUED | OUTPATIENT
Start: 2019-05-12 | End: 2019-05-13 | Stop reason: HOSPADM

## 2019-05-12 RX ORDER — LORAZEPAM 2 MG/ML
2 INJECTION INTRAMUSCULAR
Status: DISCONTINUED | OUTPATIENT
Start: 2019-05-12 | End: 2019-05-13 | Stop reason: HOSPADM

## 2019-05-12 RX ORDER — M-VIT,TX,IRON,MINS/CALC/FOLIC 27MG-0.4MG
1 TABLET ORAL DAILY
Qty: 30 TABLET | Refills: 0
Start: 2019-05-13

## 2019-05-12 RX ORDER — LORAZEPAM 2 MG/ML
4 INJECTION INTRAMUSCULAR
Status: DISCONTINUED | OUTPATIENT
Start: 2019-05-12 | End: 2019-05-13 | Stop reason: HOSPADM

## 2019-05-12 RX ORDER — QUETIAPINE FUMARATE 25 MG/1
25 TABLET, FILM COATED ORAL 2 TIMES DAILY
Qty: 60 TABLET | Refills: 0
Start: 2019-05-12

## 2019-05-12 RX ORDER — LORAZEPAM 2 MG/ML
3 INJECTION INTRAMUSCULAR
Status: DISCONTINUED | OUTPATIENT
Start: 2019-05-12 | End: 2019-05-13 | Stop reason: HOSPADM

## 2019-05-12 RX ORDER — LORAZEPAM 1 MG/1
3 TABLET ORAL
Status: DISCONTINUED | OUTPATIENT
Start: 2019-05-12 | End: 2019-05-13 | Stop reason: HOSPADM

## 2019-05-12 RX ORDER — NICOTINE 21 MG/24HR
1 PATCH, TRANSDERMAL 24 HOURS TRANSDERMAL DAILY
Qty: 30 PATCH | Refills: 0
Start: 2019-05-13

## 2019-05-12 RX ORDER — FOLIC ACID 1 MG/1
1 TABLET ORAL DAILY
Qty: 30 TABLET | Refills: 0
Start: 2019-05-13

## 2019-05-12 RX ORDER — LORAZEPAM 1 MG/1
4 TABLET ORAL
Status: DISCONTINUED | OUTPATIENT
Start: 2019-05-12 | End: 2019-05-13 | Stop reason: HOSPADM

## 2019-05-12 RX ADMIN — ENOXAPARIN SODIUM 40 MG: 40 INJECTION SUBCUTANEOUS at 09:52

## 2019-05-12 RX ADMIN — IPRATROPIUM BROMIDE AND ALBUTEROL SULFATE 1 AMPULE: .5; 3 SOLUTION RESPIRATORY (INHALATION) at 12:16

## 2019-05-12 RX ADMIN — Medication 100 MG: at 09:52

## 2019-05-12 RX ADMIN — SODIUM CHLORIDE, PRESERVATIVE FREE 10 ML: 5 INJECTION INTRAVENOUS at 10:16

## 2019-05-12 RX ADMIN — IPRATROPIUM BROMIDE AND ALBUTEROL SULFATE 1 AMPULE: .5; 3 SOLUTION RESPIRATORY (INHALATION) at 19:30

## 2019-05-12 RX ADMIN — QUETIAPINE FUMARATE 25 MG: 25 TABLET ORAL at 09:52

## 2019-05-12 RX ADMIN — AMLODIPINE BESYLATE 10 MG: 5 TABLET ORAL at 09:52

## 2019-05-12 RX ADMIN — ATORVASTATIN CALCIUM 40 MG: 40 TABLET, FILM COATED ORAL at 09:52

## 2019-05-12 RX ADMIN — LOSARTAN POTASSIUM 25 MG: 25 TABLET, FILM COATED ORAL at 09:52

## 2019-05-12 RX ADMIN — IPRATROPIUM BROMIDE AND ALBUTEROL SULFATE 1 AMPULE: .5; 3 SOLUTION RESPIRATORY (INHALATION) at 08:03

## 2019-05-12 RX ADMIN — Medication 1 TABLET: at 09:52

## 2019-05-12 RX ADMIN — FOLIC ACID 1 MG: 1 TABLET ORAL at 09:52

## 2019-05-12 NOTE — PROGRESS NOTES
Hospitalist Progress Note      PCP: Salvatore Samuels MD    Date of Admission: 5/9/2019    Chief Complaint: Progressive SOB    Hospital Course:   81 y.o. female who presented to [facility] with a 2 week course of progressive shortness of breath with cough. The shortness of breath has been getting worse with exertion. Initially it was mild. Over the course of 2 weeks it became severe. Today patient was unable to take care of herself. Went to urgent care. Pulse ox was found to be 80% on room air. Patient is not on oxygen at her baseline. Was started on oxygen and sent to the emergency room. No chest pain, no nausea, no vomiting. Nothing that makes it better or worse except that exertion makes complaints worse and rest makes it better. Never had anything like this before. No other complaints that accompany the shortness of breath. Subjective:   Pt is on RA. Afebrile.  Having maniac tendencies during exam.    Medications:  Reviewed    Infusion Medications    dextrose       Scheduled Medications    sodium chloride flush  10 mL Intravenous 2 times per day    QUEtiapine  25 mg Oral BID    therapeutic multivitamin-minerals  1 tablet Oral Daily    folic acid  1 mg Oral Daily    thiamine  100 mg Oral Daily    amLODIPine  10 mg Oral Daily    insulin lispro  0-6 Units Subcutaneous TID WC    insulin lispro  0-3 Units Subcutaneous Nightly    ipratropium-albuterol  1 ampule Inhalation TID    atorvastatin  40 mg Oral Daily    losartan  25 mg Oral Daily    sodium chloride flush  10 mL Intravenous 2 times per day    enoxaparin  40 mg Subcutaneous Daily    nicotine  1 patch Transdermal Daily     PRN Meds: sodium chloride flush, LORazepam **OR** LORazepam **OR** LORazepam **OR** LORazepam **OR** LORazepam **OR** LORazepam **OR** LORazepam **OR** LORazepam, QUEtiapine, glucose, dextrose, glucagon (rDNA), dextrose, sodium chloride flush, magnesium hydroxide, ondansetron, albuterol      Intake/Output Summary (Last 24 hours) at 5/12/2019 1235  Last data filed at 5/11/2019 2024  Gross per 24 hour   Intake 240 ml   Output --   Net 240 ml       Physical Exam Performed:    BP (!) 152/85   Pulse 75   Temp 97.9 °F (36.6 °C) (Oral)   Resp 16   Ht 5' 2\" (1.575 m)   Wt 82 lb (37.2 kg)   SpO2 92%   BMI 15.00 kg/m²     General appearance: Thin/frail female in no apparent distress, appears stated age and cooperative. HEENT: Pupils equal, round, and reactive to light. Conjunctivae/corneas clear. Neck: Supple, with full range of motion. No jugular venous distention. Trachea midline. Respiratory:  Normal respiratory effort. Poor air movement. Cardiovascular: Regular rate and rhythm with normal S1/S2 without murmurs, rubs or gallops. Abdomen: Soft, non-tender, non-distended with normal bowel sounds. Musculoskeletal: No clubbing, cyanosis or edema bilaterally. Full range of motion without deformity. Skin: Skin color, texture, turgor normal.  No rashes or lesions. Neurologic:  Neurovascularly intact without any focal sensory/motor deficits.  Cranial nerves: II-XII intact, grossly non-focal.  Psychiatric: Alert and oriented to self, year/month, limited insight   Capillary Refill: Brisk,< 3 seconds   Peripheral Pulses: +2 palpable, equal bilaterally       Labs:   Recent Labs     05/09/19  1420 05/10/19  0533   WBC 6.8 6.3   HGB 14.6 14.0   HCT 43.7 42.0    176     Recent Labs     05/09/19  1420 05/10/19  0533 05/11/19  1127    139 142   K 4.1 3.2* 3.6   CL 97* 96* 99   CO2 33* 25 31   BUN 16 8 9   CREATININE 0.8 <0.5* 0.8   CALCIUM 9.6 9.2 10.1     Recent Labs     05/09/19  1420 05/10/19  0533   AST 17 14*   ALT 9* 9*   BILITOT 0.4 0.3   ALKPHOS 53 52     Recent Labs     05/09/19  1420   TROPONINI <0.01       Urinalysis:      Lab Results   Component Value Date    NITRU Negative 05/11/2019    WBCUA 0-2 05/11/2019    BACTERIA Rare 05/11/2019    RBCUA 0-2 05/11/2019    BLOODU Negative 05/11/2019    SPECGRAV >=1.030 05/11/2019    GLUCOSEU 250 05/11/2019       Radiology:  CT CHEST WO CONTRAST   Final Result   1. Pulmonary emphysema with no acute cardiopulmonary process   2. No suspicious dominant mass. 3 mm nodule in each lower lobe   3. Areas of scarring and chronic atelectasis   4. Calcific coronary atherosclerosis   5. Age-indeterminate T10 superior endplate compression fracture      RECOMMENDATIONS:   Fleischner Society guidelines for follow-up and management of incidentally   detected pulmonary nodules:      Multiple Solid Nodules:      Nodule size less than 6 mm   In a low-risk patient, no routine follow-up. In a high-risk patient, optional CT at 12 months. - Low risk patients include individuals with minimal or absent history of   smoking and other known risk factors. - High risk patients include individuals with a history or smoking or known   risk factors. Radiology 2017 http://pubs. rsna.org/doi/full/10.1148/radiol. 4540542734         XR CHEST STANDARD (2 VW)   Final Result   1. Enlargement of the pulmonary arteries proximally bilaterally could be   related to pulmonary arterial hypertension   2. Otherwise, stable chest             Assessment/Plan:    Active Hospital Problems    Diagnosis Date Noted    Alcohol withdrawal delirium ([address] [F10.231]     Wernicke-Korsakoff syndrome [F04]     Mood disorder ([address] [F39]     Weight loss [R63.4]     Severe malnutrition ([address] [E43] 05/10/2019    COPD exacerbation ([address] [J44.1] 05/09/2019    Hypertension, essential [I10] 05/09/2019    Hypoxemia [R09.02] 05/09/2019    Tobacco abuse [Z72.0] 05/09/2019       Acute hypoxic respiratory failure secondary to acute COPD exacerbation:  - Continue inhaled bronchodilator therapy, pulmonary toilet with IS and acapella. Pulmonary consulted to establish care. Stopping PO steroids today. Weaned to room air.      Hypertension:  - BP uncontrolled. Increase amlodipine to 10 mg daily. Continue losartan.      Tobacco abuse:  - Smoking cessation. Nicotine patch prescribed. Hyperglycemia:  - Likely secondary to steroids. Check A1C. Low dose SSI for now. Alcohol dependence with withdrawal  - continue vitamin supplements  - CIWA with PRN ativan ordered. Currently scoring low. Arlene/acute encephalopathy  - Pt is oriented to person, confused about place and sometimes time. Patient having pressured speech and very fixated on leaving the building despite not knowing were she is. Based on discussion with friend, this appears to be close to baseline. - psych consulted for maniac behavior.  - started seroquel with improvement in behavior.   - Will need inpatient psych stay. Currently without capacity. Hypokalemia:  - Monitor and replete as needed. Mag is WNL. Severe protein malnutrition:  - Likely secondary to pulmonary cachexia. BMI 15. Consult dietician. DVT Prophylaxis: Lovenox   Diet: DIET GENERAL;  Dietary Nutrition Supplements: Standard High Calorie Oral Supplement  Code Status: Full Code    PT/OT Eval Status: Not indicated     Dispo - medically ready for discharge.  Plan for IP psych transfer    Erick Shelton MD

## 2019-05-12 NOTE — CONSULTS
PSYCHIATRY CONSULT, INITIAL EVALUATION    Henri Sue [id number]     Date/time of admission: 2019  2:01 PM    CC/Reason for Consult: confusion    HPI: 81 y.o. female with h/o etoh use disorder, depression who is admitted to medicine for COPD. Psych consulted as patient was showing \"manic behaviors\" and confusion. Pt was out of bed walking around her room and making bizarre statements indicating she was not oriented. She believed she was in a bank, or about to go to lunch with friends. She was confused about remote events, believing people who  were still alive. I spoke to nursing last and agreed with primary team's plan to give Seroquel. This morning I spoke to pt at bedside and she was alert and more oriented than yesterday. Knows she is in a hospital but not which one. Knows why she is here and remembers being agitated yesterday, says she feels better this morning after sleeping well. She minimizes her drinking, contradicting herself several times. Admits to being very depressed over the past several months, and not caring for herself or her home. She c/o low energy/motivation. Feels hopeless at times. +Anhedonia. Denies SI/HI, AVH. Apparently she or her family put the pt's dog down recently, though pt told me she wants to go home to see her dog. Collateral from daughter Mar Ramos: Pt has been drinking daily for 30 years. Strained rel with daughters. Says mother was in ICU for etoh w/d for 6 days 2 years ago with similar behaviors to this weekend, starting around day 4. 2 of patient's friends have been calling daughter saying pt has been more confused in the past 6 months. Daughters have noticed over the past 1 year. Pt has not been caring for her home, there was dog feces everywhere. Has not been keeping up with her hygiene, feels she has not been able to take care of herself for several weeks. Hasn't been eating and lost weight.      context: medical hospitalization  severity: severe  location: AMS / mood disturbance  associated symptoms: see above  modifiers: course of illness, stressors  duration: acute      Past Psychiatric History:    Prior hospitalizations: Denies    Prior diagnoses: Depression, etoh use d/o   Prior medication trials: Paxil, prozac, wellbutrin doesn't take for long enough. Outpatient Treatment:  PCP   Suicide Attempts: Denies      Substance Use History:   Nicotine:    Alcohol: Daily for 30 years. Pt minimizes amount, says 2 beers/day but daughter says more than this. Illicits:Denies   Caffeine:    Past Medical History:   Past Medical History:   Diagnosis Date    [facility]     Anxiety     COPD [facility]chronic obstructive pulmonary disease) [facility]     Current smoker     since age [place]    Hyperlipidemia     Hypertension     Wernicke-Korsakoff syndrome (alcoholic) ([facility])      Past Surgical History:   Procedure Laterality Date    BRAIN ANEURYSM SURGERY      coiled    CATARACT REMOVAL WITH IMPLANT Right 05/25/2017    CATARACT REMOVAL WITH IMPLANT Left 06/01/2017       Social/Developmental History:    Relationship:  8 years   Children: 2 daughters   Housing: Alone    Occupation/Income: Retired   Education:   Legal hx: DUI   Abuse hx:   Violence hx:   Access to firearms: No     Family History:   Family History   Problem Relation Age of Onset    High Cholesterol Mother     High Blood Pressure Father       Medical:   Psychiatric: Denies   History of completed suicide: Denies    Allergies: Allergies   Allergen Reactions    Sulfacetamide        Home Medications:   No current facility-administered medications on file prior to encounter. Current Outpatient Medications on File Prior to Encounter   Medication Sig Dispense Refill    losartan (COZAAR) 25 MG tablet Take 1 tablet by mouth      albuterol sulfate HFA (PROAIR HFA) 108 (90 Base) MCG/ACT inhaler Use 2 puffs every 4 hours while awake PRN cough/wheezing  Dispense with SPACER and Instruct on use.   May sub Ventolin or Proventil as needed per [employer]. 1 Inhaler 1    tiotropium (SPIRIVA) 18 MCG inhalation capsule Inhale 1 capsule into the lungs daily 30 capsule 1    amLODIPine (NORVASC) 5 MG tablet Take 5 mg by mouth daily      atorvastatin (LIPITOR) 40 MG tablet Take 40 mg by mouth daily. Current Medications ordered:  Scheduled Meds:   QUEtiapine  25 mg Oral BID    therapeutic multivitamin-minerals  1 tablet Oral Daily    folic acid  1 mg Oral Daily    thiamine  100 mg Oral Daily    amLODIPine  10 mg Oral Daily    insulin lispro  0-6 Units Subcutaneous TID WC    insulin lispro  0-3 Units Subcutaneous Nightly    ipratropium-albuterol  1 ampule Inhalation TID    atorvastatin  40 mg Oral Daily    losartan  25 mg Oral Daily    sodium chloride flush  10 mL Intravenous 2 times per day    enoxaparin  40 mg Subcutaneous Daily    nicotine  1 patch Transdermal Daily     Continuous Infusions:   dextrose       PRN Meds:. QUEtiapine, glucose, dextrose, glucagon (rDNA), dextrose, sodium chloride flush, magnesium hydroxide, ondansetron, albuterol    ROS: Denies trouble with fever, rash, headache, vision changes, chest pain, shortness of breath, nausea, extremity pain, weakness, dysuria. OBJECTIVE:  Vitals: Rodriguez Tabares Vitals:    05/12/19 0327 05/12/19 0805 05/12/19 0830 05/12/19 0834   BP: (!) 147/81   (!) 152/85   Pulse: 64  75 75   Resp: 16   16   Temp: 97.7 °F (36.5 °C)   97.9 °F (36.6 °C)   TempSrc: Oral   Oral   SpO2: 93% 92% (!) 87% 92%   Weight:       Height:         Body mass index is 15 kg/m².     Mental Status Exam:   Appearance: appears stated age, fair eye contact  Behavior/Movement/Muscle Tone: no PMR/PMA, no abnormal movements appreciated, anti-gravity  Gait/station: not tested  Speech:tone, prosody, volume, rate, production wnl, fluent nonpressured  Mood/Affect: depressed, tearful  Thought Process: goal directed, linear, no FOI, no SHILA  Thought Content: Denies SI/HI, no delusions expressed, no AH/VH and not RTIS  Orientation: alert, oriented to person place and situation - waxing/waning disorientation  Fund Of Knowledge: appears consistent with average intellect  Memory:grossly impaired to recent and remote content discussed  Insight/Judgment: poor/poor    Labs:   Complete Blood Count:  Recent Labs     19  1420 05/10/19  0533   WBC 6.8 6.3   HGB 14.6 14.0   HCT 43.7 42.0   MCV 90.7 90.8    176       Basic Metabolic Panel:  Recent Labs     19  1420 05/10/19  0533 19  1127    139 142   K 4.1 3.2* 3.6   CL 97* 96* 99   CO2 33* 25 31   BUN 16 8 9   MG  --  1.90  --        Hepatic Panel:  Recent Labs     19  1420 05/10/19  0533   AST 17 14*   ALT 9* 9*        Lab Results   Component Value Date    COLORU Yellow 2019    NITRU Negative 2019    GLUCOSEU 250 2019    KETUA Negative 2019    UROBILINOGEN 0.2 2019    BILIRUBINUR Negative 2019         Imagin [facility] 3/27/19   1. No acute intracranial abnormality. 2. Interval coiling of a right ICA aneurysm with significant artifact   limiting sensitivity of the current head CT. 3. Remote infarcts in the right basal ganglia in the right posterior   parieto-occipital region are noted, new since 04/10/2017 CT. Axis I  Alcohol withdrawal delirium, Mood disorder NOS (SIMD vs MDD), Wernicke-Korsakoff's syndrome by hx    Axis II: No diagnosis     Axis III       Diagnosis Date    [facility]     Anxiety     COPD (chronic obstructive pulmonary disease) (HCC)     Current smoker     since age 15    Hyperlipidemia     Hypertension     Wernicke-Korsakoff syndrome (alcoholic) (HCC)       Principal Problem:    COPD exacerbation (HCC)  Active Problems:    Hypertension, essential    Hypoxemia    Tobacco abuse    Severe malnutrition (HCC)    Weight loss  Resolved Problems:    * No resolved hospital problems.  *       Axis IV  Problems with primary support group and Problems related to the social appropriate mobility established either by baseline or PT/OT evals, and outpatient medical followup plan in place)    Thank you for this consult, please call us with any further questions. I will not continue to follow at this time.     Landen Brown   [facility]

## 2019-05-12 NOTE — PROGRESS NOTES
Throughout shift pt has been mostly oriented, forgets what hospital she is at, and keeps saying she is here for pneumonia. Also at times will acknowledge dog is dead, then at others says she needs to be home to care for her dog, and that he is in poor health. Has needed oxygen at times throughout shift, encouragement of use of IS. CIWA assessed at beginning of shift, score 7. Pt up and on multiple walks with standby assist throughout the shift, does not require oxygen when up and ambulatory. Bed check and avasys in place for safety. Call light within reach. Will continue to monitor.

## 2019-05-12 NOTE — PLAN OF CARE
Problem: Falls - Risk of:  Goal: Will remain free from falls  Description  Will remain free from falls  Outcome: Ongoing  Note:   Pt free from falls. Bed check and avasys in place. Bed locked and in lowest position call light within reach. Use of nonskid socks. Belongings within reach. Will continue to monitor.

## 2019-05-12 NOTE — PLAN OF CARE
Pt remained free of falls. Call light within reach. Bed alarm on. Avasys on for safety. Patient elopement precautions. Non skid footwear in place. Bed locked and in lowest position. Will continue to monitor.

## 2019-05-12 NOTE — DISCHARGE SUMMARY
year/month, limited insight   Capillary Refill: Brisk,< 3 seconds   Peripheral Pulses: +2 palpable, equal bilaterally       Labs: For convenience and continuity at follow-up the following most recent labs are provided:      CBC:    Lab Results   Component Value Date    WBC 6.3 05/10/2019    HGB 14.0 05/10/2019    HCT 42.0 05/10/2019     05/10/2019       Renal:    Lab Results   Component Value Date     05/11/2019    K 3.6 05/11/2019    CL 99 05/11/2019    CO2 31 05/11/2019    BUN 9 05/11/2019    CREATININE 0.8 05/11/2019    CALCIUM 10.1 05/11/2019    PHOS 4.7 04/18/2017         Significant Diagnostic Studies    Radiology:   CT CHEST WO CONTRAST   Final Result   1. Pulmonary emphysema with no acute cardiopulmonary process   2. No suspicious dominant mass. 3 mm nodule in each lower lobe   3. Areas of scarring and chronic atelectasis   4. Calcific coronary atherosclerosis   5. Age-indeterminate T10 superior endplate compression fracture      RECOMMENDATIONS:   Fleischner Society guidelines for follow-up and management of incidentally   detected pulmonary nodules:      Multiple Solid Nodules:      Nodule size less than 6 mm   In a low-risk patient, no routine follow-up. In a high-risk patient, optional CT at 12 months. - Low risk patients include individuals with minimal or absent history of   smoking and other known risk factors. - High risk patients include individuals with a history or smoking or known   risk factors. Radiology 2017 http://pubs. rsna.org/doi/full/10.1148/radiol. 7932780775         XR CHEST STANDARD (2 VW)   Final Result   1. Enlargement of the pulmonary arteries proximally bilaterally could be   related to pulmonary arterial hypertension   2.  Otherwise, stable chest                Consults:     IP CONSULT TO PULMONOLOGY  IP CONSULT TO DIETITIAN  IP CONSULT TO PSYCHIATRY  IP CONSULT TO SOCIAL WORK    Disposition:  IP psych     Condition at Discharge: Stable    Discharge Instructions/Follow-up:  Follow up with PCP, pulmonary within 1-2 weeks    Code Status:  Full Code     Activity: activity as tolerated    Diet: regular diet      Discharge Medications:     Current Discharge Medication List           Details   QUEtiapine (SEROQUEL) 25 MG tablet Take 1 tablet by mouth 2 times daily  Qty: 60 tablet, Refills: 0      folic acid (FOLVITE) 1 MG tablet Take 1 tablet by mouth daily  Qty: 30 tablet, Refills: 0      nicotine (NICODERM CQ) 14 MG/24HR Place 1 patch onto the skin daily  Qty: 30 patch, Refills: 0      Multiple Vitamins-Minerals (THERAPEUTIC MULTIVITAMIN-MINERALS) tablet Take 1 tablet by mouth daily  Qty: 30 tablet, Refills: 0      vitamin B-1 100 MG tablet Take 1 tablet by mouth daily  Qty: 30 tablet, Refills: 0              Details   losartan (COZAAR) 25 MG tablet Take 1 tablet by mouth      albuterol sulfate HFA (PROAIR HFA) 108 (90 Base) MCG/ACT inhaler Use 2 puffs every 4 hours while awake PRN cough/wheezing  Dispense with SPACER and Instruct on use. May sub Ventolin or Proventil as needed per [employer]. Qty: 1 Inhaler, Refills: 1      tiotropium (SPIRIVA) 18 MCG inhalation capsule Inhale 1 capsule into the lungs daily  Qty: 30 capsule, Refills: 1      amLODIPine (NORVASC) 5 MG tablet Take 5 mg by mouth daily      atorvastatin (LIPITOR) 40 MG tablet Take 40 mg by mouth daily. Time Spent on discharge is more than 30 minutes in the examination, evaluation, counseling and review of medications and discharge plan. Signed:    Lo Joy MD   5/12/2019      Thank you Johan Meng MD for the opportunity to be involved in this patient's care. If you have any questions or concerns please feel free to contact me at [phone].

## 2019-05-13 VITALS
TEMPERATURE: 98 F | OXYGEN SATURATION: 91 % | DIASTOLIC BLOOD PRESSURE: 80 MMHG | SYSTOLIC BLOOD PRESSURE: 162 MMHG | RESPIRATION RATE: 18 BRPM | HEART RATE: 77 BPM | BODY MASS INDEX: 15.09 KG/M2 | HEIGHT: 62 IN | WEIGHT: 82 LBS

## 2019-05-13 LAB
GLUCOSE BLD-MCNC: 100 MG/DL (ref 70–99)
GLUCOSE BLD-MCNC: 83 MG/DL (ref 70–99)
GLUCOSE BLD-MCNC: 87 MG/DL (ref 70–99)
PERFORMED ON: ABNORMAL
PERFORMED ON: NORMAL
PERFORMED ON: NORMAL

## 2019-05-13 PROCEDURE — 6370000000 HC RX 637 (ALT 250 FOR IP): Performed by: PSYCHIATRY & NEUROLOGY

## 2019-05-13 PROCEDURE — 6360000002 HC RX W HCPCS: Performed by: INTERNAL MEDICINE

## 2019-05-13 PROCEDURE — 6370000000 HC RX 637 (ALT 250 FOR IP): Performed by: INTERNAL MEDICINE

## 2019-05-13 PROCEDURE — 94640 AIRWAY INHALATION TREATMENT: CPT

## 2019-05-13 PROCEDURE — 2580000003 HC RX 258: Performed by: INTERNAL MEDICINE

## 2019-05-13 PROCEDURE — 6370000000 HC RX 637 (ALT 250 FOR IP): Performed by: NURSE PRACTITIONER

## 2019-05-13 PROCEDURE — 6370000000 HC RX 637 (ALT 250 FOR IP): Performed by: REGISTERED NURSE

## 2019-05-13 RX ADMIN — LOSARTAN POTASSIUM 25 MG: 25 TABLET, FILM COATED ORAL at 09:11

## 2019-05-13 RX ADMIN — Medication 100 MG: at 09:12

## 2019-05-13 RX ADMIN — IPRATROPIUM BROMIDE AND ALBUTEROL SULFATE 1 AMPULE: .5; 3 SOLUTION RESPIRATORY (INHALATION) at 07:49

## 2019-05-13 RX ADMIN — QUETIAPINE FUMARATE 25 MG: 25 TABLET ORAL at 03:33

## 2019-05-13 RX ADMIN — IPRATROPIUM BROMIDE AND ALBUTEROL SULFATE 1 AMPULE: .5; 3 SOLUTION RESPIRATORY (INHALATION) at 13:56

## 2019-05-13 RX ADMIN — FOLIC ACID 1 MG: 1 TABLET ORAL at 09:11

## 2019-05-13 RX ADMIN — Medication 1 TABLET: at 09:11

## 2019-05-13 RX ADMIN — AMLODIPINE BESYLATE 10 MG: 5 TABLET ORAL at 09:11

## 2019-05-13 RX ADMIN — QUETIAPINE FUMARATE 25 MG: 25 TABLET ORAL at 09:11

## 2019-05-13 RX ADMIN — ATORVASTATIN CALCIUM 40 MG: 40 TABLET, FILM COATED ORAL at 09:11

## 2019-05-13 RX ADMIN — SODIUM CHLORIDE, PRESERVATIVE FREE 10 ML: 5 INJECTION INTRAVENOUS at 09:12

## 2019-05-13 RX ADMIN — ENOXAPARIN SODIUM 40 MG: 40 INJECTION SUBCUTANEOUS at 09:12

## 2019-05-13 NOTE — PROGRESS NOTES
Assessment completed and documented. VSS. A/ox4. Patient states she is bored so PCA took a walk with her in the hallway, tolerated fine. Patient calm and cooperative. Denies pain. Denies further needs at this time. Bed locked and in lowest position. Bedside table and call light within reach. Will continue to monitor.

## 2019-05-13 NOTE — PROGRESS NOTES
Daughter brought in home medication. Ativan counted with [facility], sealed in bag, and sent to pharmacy.

## 2019-05-13 NOTE — CARE COORDINATION
Informed the transfer center that the note necessary for discharge is in. Sw will follow and assist as able.

## 2019-05-13 NOTE — PROGRESS NOTES
Pt alert and oriented x2. VSS, on room air. Encouragement of IS and sitting up. CIWA score 2 (d/t tremors, which daughter states she always has). Shift assessment completed and documented. Avasys in place for safety. Call light within reach. Will continue to monitor.

## 2019-05-13 NOTE — CARE COORDINATION
Jose was informed by RN on floor that pt will be transferring to [facility] at 1700. Sw called the transfer center to discuss difficulties with communication on this day regarding this pt.

## 2019-05-13 NOTE — PROGRESS NOTES
Discharge: Pt discharged [facility] as per order. IV removed. Scripts plus instructions given;  Family informed; They said they did not want the ativan to be discharged with pt and to be thrown away.

## 2019-05-13 NOTE — PROGRESS NOTES
Assuming care from Nisha Randolph, [facility]. Agree with assessments. Will continue to monitor.

## 2019-05-13 NOTE — DISCHARGE SUMMARY
Hospital Medicine Discharge Summary    Patient ID: Moreno Mckeon      Patient's PCP: Krupa Roberts MD    Admit Date: 5/9/2019     Discharge Date:   05/13/19    Admitting Physician: Chris Yun MD     Discharge Physician: Lance Borja MD     Discharge Diagnoses: Active Hospital Problems    Diagnosis Date Noted    Alcohol withdrawal delirium ([facility] [F10.231]     Wernicke-Korsakoff syndrome [F04]     [facility] [F39]     Weight loss [R63.4]     Severe malnutrition ([facility] [E43] 05/10/2019    COPD exacerbation ([facility] [J44.1] 05/09/2019    Hypertension, essential [I10] 05/09/2019    Hypoxemia [R09.02] 05/09/2019    Tobacco abuse [Z72.0] 05/09/2019       The patient was seen and examined on day of discharge and this discharge summary is in conjunction with any daily progress note from day of discharge. Hospital Course:   81 y.o. female who presented to [facility] with a 2 week course of progressive shortness of breath with cough. The shortness of breath has been getting worse with exertion. Initially it was mild. Over the course of 2 weeks it became severe. Today patient was unable to take care of herself. Went to urgent care. Pulse ox was found to be 80% on room air. Patient is not on oxygen at her baseline. Was started on oxygen and sent to the emergency room. No chest pain, no nausea, no vomiting. Nothing that makes it better or worse except that exertion makes complaints worse and rest makes it better. Never had anything like this before. No other complaints that accompany the shortness of breath. Acute hypoxic respiratory failure secondary to acute COPD exacerbation:  - Continue inhaled bronchodilator therapy, pulmonary toilet with IS and acapella. Pulmonary consulted to establish care. Stopping PO steroids. Weaned to room air. Will need follow up with Pulm as outpatient for PFTs.     Hypertension:  - BP uncontrolled. Increase amlodipine to 10 mg daily.  Continue losartan.      Tobacco abuse:  - Smoking cessation. Nicotine patch prescribed.     Hyperglycemia:  - Likely secondary to steroids. Check A1C. Low dose SSI for now.      Alcohol dependence with withdrawal  - continue vitamin supplements  - CIWA with PRN ativan ordered. Currently scoring low.     Arlene/acute encephalopathy  -Acute delirium has resolved. - Pt is oriented to person, place, season, year. Initially she was confused about place and sometimes time. Previously patient having pressured speech and very fixated on leaving the building despite not knowing were she is. Based on discussion with friend, this appears to be close to baseline. She is now agreeable to go to [facility]. - psych consulted for maniac behavior.  - started seroquel with improvement in behavior.   - DCed to inpatient psych unit. Currently without capacity.     Hypokalemia:  - Monitor and replete as needed. Mag is WNL.      Severe protein malnutrition:  - Likely secondary to pulmonary cachexia and underlying psychiatric issues. BMI 15. Consult dietician. Physical Exam Performed:     BP (!) 162/80   Pulse 77   Temp 98 °F (36.7 °C) (Oral)   Resp 18   Ht 5' 2\" (1.575 m)   Wt 82 lb (37.2 kg)   SpO2 90%   BMI 15.00 kg/m²       General appearance: Thin/frail female in no apparent distress, appears stated age and cooperative. HEENT: Pupils equal, round, and reactive to light. Conjunctivae/corneas clear. Neck: Supple, with full range of motion. No jugular venous distention. Trachea midline. Respiratory:  Normal respiratory effort. Poor air movement. Cardiovascular: Regular rate and rhythm with normal S1/S2 without murmurs, rubs or gallops. Abdomen: Soft, non-tender, non-distended with normal bowel sounds. Musculoskeletal: No clubbing, cyanosis or edema bilaterally. Full range of motion without deformity. Skin: Skin color, texture, turgor normal.  No rashes or lesions.   Neurologic:  Neurovascularly intact without any focal sensory/motor deficits. Cranial nerves: II-XII intact, grossly non-focal.  Psychiatric: Alert and oriented to self, year/month, limited insight   Capillary Refill: Brisk,< 3 seconds   Peripheral Pulses: +2 palpable, equal bilaterally       Labs: For convenience and continuity at follow-up the following most recent labs are provided:      CBC:    Lab Results   Component Value Date    WBC 6.3 05/10/2019    HGB 14.0 05/10/2019    HCT 42.0 05/10/2019     05/10/2019       Renal:    Lab Results   Component Value Date     05/11/2019    K 3.6 05/11/2019    CL 99 05/11/2019    CO2 31 05/11/2019    BUN 9 05/11/2019    CREATININE 0.8 05/11/2019    CALCIUM 10.1 05/11/2019    PHOS 4.7 04/18/2017         Significant Diagnostic Studies    Radiology:   CT CHEST WO CONTRAST   Final Result   1. Pulmonary emphysema with no acute cardiopulmonary process   2. No suspicious dominant mass. 3 mm nodule in each lower lobe   3. Areas of scarring and chronic atelectasis   4. Calcific coronary atherosclerosis   5. Age-indeterminate T10 superior endplate compression fracture      RECOMMENDATIONS:   Fleischner Society guidelines for follow-up and management of incidentally   detected pulmonary nodules:      Multiple Solid Nodules:      Nodule size less than 6 mm   In a low-risk patient, no routine follow-up. In a high-risk patient, optional CT at 12 months. - Low risk patients include individuals with minimal or absent history of   smoking and other known risk factors. - High risk patients include individuals with a history or smoking or known   risk factors. Radiology 2017 http://pubs. rsna.org/doi/full/10.1148/radiol. 9745749546         XR CHEST STANDARD (2 VW)   Final Result   1. Enlargement of the pulmonary arteries proximally bilaterally could be   related to pulmonary arterial hypertension   2.  Otherwise, stable chest                Consults:     IP CONSULT TO PULMONOLOGY  IP CONSULT TO DIETITIAN  IP CONSULT TO PSYCHIATRY  IP CONSULT TO SOCIAL WORK    Disposition:  IP psych     Condition at Discharge: Stable    Discharge Instructions/Follow-up:  Follow up with PCP, pulmonary within 1-2 weeks    Code Status:  Full Code     Activity: activity as tolerated    Diet: regular diet      Discharge Medications:     Current Discharge Medication List           Details   QUEtiapine (SEROQUEL) 25 MG tablet Take 1 tablet by mouth 2 times daily  Qty: 60 tablet, Refills: 0      folic acid (FOLVITE) 1 MG tablet Take 1 tablet by mouth daily  Qty: 30 tablet, Refills: 0      nicotine (NICODERM CQ) 14 MG/24HR Place 1 patch onto the skin daily  Qty: 30 patch, Refills: 0      Multiple Vitamins-Minerals (THERAPEUTIC MULTIVITAMIN-MINERALS) tablet Take 1 tablet by mouth daily  Qty: 30 tablet, Refills: 0      vitamin B-1 100 MG tablet Take 1 tablet by mouth daily  Qty: 30 tablet, Refills: 0              Details   aspirin 325 MG EC tablet Take 325 mg by mouth daily      acetaminophen (TYLENOL) 325 MG tablet Take 650 mg by mouth every 6 hours as needed for Pain or Fever      losartan (COZAAR) 25 MG tablet Take 1 tablet by mouth      albuterol sulfate HFA (PROAIR HFA) 108 (90 Base) MCG/ACT inhaler Use 2 puffs every 4 hours while awake PRN cough/wheezing  Dispense with SPACER and Instruct on use. May sub Ventolin or Proventil as needed per [employer]. Qty: 1 Inhaler, Refills: 1      tiotropium (SPIRIVA) 18 MCG inhalation capsule Inhale 1 capsule into the lungs daily  Qty: 30 capsule, Refills: 1      amLODIPine (NORVASC) 5 MG tablet Take 5 mg by mouth daily      atorvastatin (LIPITOR) 40 MG tablet Take 40 mg by mouth daily. Time Spent on discharge is more than 30 minutes in the examination, evaluation, counseling and review of medications and discharge plan. Signed:    Brian Paige MD   5/13/2019      Thank you Lawson Doran MD for the opportunity to be involved in this patient's care.  If you have any questions or concerns please

## 2019-05-13 NOTE — DISCHARGE INSTR - COC
Continuity of Care Form    Patient Name: Carla Washington   :  1950  MRN:  [id number]    Admit date:  2019  Discharge date:  19    Code Status Order: Full Code   Advance Directives:   [facility] Documentation     Date/Time Healthcare Directive Type of Healthcare Directive Copy in [facility] [address] Agent's Name Healthcare Agent's Phone Number    19  Yes, patient has an advance directive for healthcare treatment  Durable power of  for health care;Living will  No, copy requested from family  --  --  --          Admitting Physician:  Natty Goodman MD  PCP: Rajani Arevalo MD    Discharging Nurse: Charmain Epley [facility] Unit/Room#: 0329/0329-01  Discharging Unit Phone Number: [phone]    Emergency Contact:   Extended Emergency Contact Information  Primary Emergency Contact: Raven mckenzie [facility])  Mobile Phone: [phone]  Relation: Child   needed?  No  Secondary Emergency Contact: Tamia mckenzie  Mobile Phone: [phone]  Relation: Child    Past Surgical History:  Past Surgical History:   Procedure Laterality Date    BRAIN ANEURYSM SURGERY      coiled    CATARACT REMOVAL WITH IMPLANT Right 2017    CATARACT REMOVAL WITH IMPLANT Left 2017       Immunization History:   Immunization History   Administered Date(s) Administered    Influenza Virus Vaccine 10/20/2014    Pneumococcal 13-valent Conjugate (Beau Aditya) 10/31/2014       Active Problems:  Patient Active Problem List   Diagnosis Code    CAP (community acquired pneumonia) J18.9    COPD with acute exacerbation ([facility] [address] J44.1    Tobacco use Z72.0    SOB (shortness of breath) R06.02    Hyponatremia E87.1    Acute respiratory failure (HCC) J96.00    Underweight R63.6    Acute metabolic encephalopathy C68.89    COPD (chronic obstructive pulmonary disease) (HCC) J44.9    COPD exacerbation (HCC) J44.1    Hypertension, essential I10    Hypoxemia R09.02    Tobacco abuse Z72.0    Severe malnutrition (HCC) E43    Weight loss R63.4    Alcohol withdrawal delirium (HCC) F10.231    Wernicke-Korsakoff syndrome F04    Mood disorder (HCC) F39       Isolation/Infection:   Isolation          No Isolation            Nurse Assessment:  Last Vital Signs: BP (!) 162/80   Pulse 77   Temp 98 °F (36.7 °C) (Oral)   Resp 18   Ht 5' 2\" (1.575 m)   Wt 82 lb (37.2 kg)   SpO2 90%   BMI 15.00 kg/m²     Last documented pain score (0-10 scale): Pain Level: 0  Last Weight:   Wt Readings from Last 1 Encounters:   05/09/19 82 lb (37.2 kg)     Mental Status:  disoriented and oriented    IV Access:  - None    Nursing Mobility/ADLs:  Walking   Independent  Transfer  Independent  Bathing  Independent  Dressing  Independent  Toileting  Independent  Feeding  Independent  Med Admin  Assisted  Med Delivery   whole and one at a time    Wound Care Documentation and Therapy:  Wound 04/10/17 Eye Left (Active)   Number of days: 762       Wound 04/10/17 Avulsion Head Anterior red \"knot\" noted to right forhead. (Active)   Number of days: 762        Elimination:  Continence:   · Bowel: Yes  · Bladder: Yes  Urinary Catheter: None   Colostomy/Ileostomy/Ileal Conduit: No       Date of Last BM: ***    Intake/Output Summary (Last 24 hours) at 5/13/2019 1148  Last data filed at 5/12/2019 2041  Gross per 24 hour   Intake 240 ml   Output --   Net 240 ml     I/O last 3 completed shifts: In: 240 [P.O.:240]  Out: -     Safety Concerns:     None    Impairments/Disabilities:      None    Nutrition Therapy:  Current Nutrition Therapy:   - Oral Diet:  General    Routes of Feeding: Oral  Liquids: No Restrictions  Daily Fluid Restriction: no  Last Modified Barium Swallow with Video (Video Swallowing Test): not done    Treatments at the Time of Hospital Discharge:   Respiratory Treatments: none  Oxygen Therapy:  is not on home oxygen therapy.   Ventilator:    - No ventilator support    Rehab Therapies: psych  Weight Bearing Status/Restrictions: No weight bearing restirctions  Other Medical Equipment (for information only, NOT a DME order):  none  Other Treatments: none    Patient's personal belongings (please select all that are sent with patient):  clothing    RN SIGNATURE:  Electronically signed by Wes Brittle, RN on 5/13/19 at 4:11 PM    CASE MANAGEMENT/SOCIAL WORK SECTION    Inpatient Status Date: ***    Readmission Risk Assessment Score:  Readmission Risk              Risk of Unplanned Readmission:        20           Discharging to Facility/ Agency   · Name:   · Address:  · Phone:  · Fax:    Dialysis Facility (if applicable)   · Name:  · Address:  · Dialysis Schedule:  · Phone:  · Fax:    / signature: {Esignature:[id number]}    PHYSICIAN SECTION    Prognosis: {Prognosis:6203174804}    Condition at Discharge: [facility] Patient Condition:065546475}    Rehab Potential (if transferring to Rehab): {Prognosis:8632135655}    Recommended Labs or Other Treatments After Discharge: ***    Physician Certification: I certify the above information and transfer of Real Zavala  is necessary for the continuing treatment of the diagnosis listed and that she requires {Admit to Appropriate Level of Care:27426} for {GREATER/LESS:729568726} 30 days.      Update Admission H&P: {CHP DME Changes in [facility]:074528127}    PHYSICIAN SIGNATURE:  {Esignature:[id number]}

## 2019-05-13 NOTE — PLAN OF CARE
Encouraged patient to turn while in bed. Encouraged ambulation. pca walked with patient in hallway. Will continue to monitor.

## 2019-05-13 NOTE — CARE COORDINATION
981-beds notes that [facility] is able to accept pt on this day pending the acute delirium has cleared. ARY informed MD on this day to assess and make a note if accurate. Ary will follow. Divya Roy- [phone] is daughter who is present on this day    Candance Sidle- [phone]  If pt's other daughter. Kamilla Johnson is list as POA for pt, but daughter indicates that he is the executer of the will. SW will keep Tamia informed of discharge time.
